# Patient Record
Sex: FEMALE | Race: WHITE | Employment: FULL TIME | URBAN - NONMETROPOLITAN AREA
[De-identification: names, ages, dates, MRNs, and addresses within clinical notes are randomized per-mention and may not be internally consistent; named-entity substitution may affect disease eponyms.]

---

## 2023-08-01 ASSESSMENT — PATIENT HEALTH QUESTIONNAIRE - PHQ9
SUM OF ALL RESPONSES TO PHQ QUESTIONS 1-9: 1
SUM OF ALL RESPONSES TO PHQ9 QUESTIONS 1 & 2: 1
1. LITTLE INTEREST OR PLEASURE IN DOING THINGS: NOT AT ALL
SUM OF ALL RESPONSES TO PHQ QUESTIONS 1-9: 1
2. FEELING DOWN, DEPRESSED OR HOPELESS: SEVERAL DAYS
1. LITTLE INTEREST OR PLEASURE IN DOING THINGS: 0
SUM OF ALL RESPONSES TO PHQ QUESTIONS 1-9: 1
SUM OF ALL RESPONSES TO PHQ9 QUESTIONS 1 & 2: 1
SUM OF ALL RESPONSES TO PHQ QUESTIONS 1-9: 1
2. FEELING DOWN, DEPRESSED OR HOPELESS: 1

## 2023-08-04 ENCOUNTER — OFFICE VISIT (OUTPATIENT)
Dept: OBGYN CLINIC | Age: 23
End: 2023-08-04
Payer: COMMERCIAL

## 2023-08-04 VITALS
DIASTOLIC BLOOD PRESSURE: 62 MMHG | BODY MASS INDEX: 31.55 KG/M2 | HEART RATE: 77 BPM | SYSTOLIC BLOOD PRESSURE: 113 MMHG | WEIGHT: 167 LBS

## 2023-08-04 DIAGNOSIS — Z01.419 WELL WOMAN EXAM WITH ROUTINE GYNECOLOGICAL EXAM: Primary | ICD-10-CM

## 2023-08-04 DIAGNOSIS — N92.6 IRREGULAR MENSES: ICD-10-CM

## 2023-08-04 DIAGNOSIS — Z12.39 ENCOUNTER FOR SCREENING BREAST EXAMINATION: ICD-10-CM

## 2023-08-04 PROCEDURE — 99395 PREV VISIT EST AGE 18-39: CPT | Performed by: NURSE PRACTITIONER

## 2023-08-04 ASSESSMENT — ENCOUNTER SYMPTOMS
RESPIRATORY NEGATIVE: 1
EYES NEGATIVE: 1
GASTROINTESTINAL NEGATIVE: 1
ALLERGIC/IMMUNOLOGIC NEGATIVE: 1

## 2023-11-22 ENCOUNTER — TELEPHONE (OUTPATIENT)
Dept: OBGYN CLINIC | Age: 23
End: 2023-11-22

## 2023-11-22 NOTE — TELEPHONE ENCOUNTER
Patient called in for pregnancy confirmation   positive test 11/22/23 last cycle 10/09/23  Please return call asap  Thank you

## 2023-11-24 SDOH — ECONOMIC STABILITY: TRANSPORTATION INSECURITY
IN THE PAST 12 MONTHS, HAS LACK OF TRANSPORTATION KEPT YOU FROM MEETINGS, WORK, OR FROM GETTING THINGS NEEDED FOR DAILY LIVING?: NO

## 2023-11-24 SDOH — ECONOMIC STABILITY: HOUSING INSECURITY
IN THE LAST 12 MONTHS, WAS THERE A TIME WHEN YOU DID NOT HAVE A STEADY PLACE TO SLEEP OR SLEPT IN A SHELTER (INCLUDING NOW)?: NO

## 2023-11-24 SDOH — ECONOMIC STABILITY: FOOD INSECURITY: WITHIN THE PAST 12 MONTHS, THE FOOD YOU BOUGHT JUST DIDN'T LAST AND YOU DIDN'T HAVE MONEY TO GET MORE.: NEVER TRUE

## 2023-11-24 SDOH — ECONOMIC STABILITY: FOOD INSECURITY: WITHIN THE PAST 12 MONTHS, YOU WORRIED THAT YOUR FOOD WOULD RUN OUT BEFORE YOU GOT MONEY TO BUY MORE.: NEVER TRUE

## 2023-11-24 SDOH — ECONOMIC STABILITY: INCOME INSECURITY: HOW HARD IS IT FOR YOU TO PAY FOR THE VERY BASICS LIKE FOOD, HOUSING, MEDICAL CARE, AND HEATING?: NOT HARD AT ALL

## 2023-11-27 ENCOUNTER — OFFICE VISIT (OUTPATIENT)
Dept: OBGYN CLINIC | Age: 23
End: 2023-11-27
Payer: COMMERCIAL

## 2023-11-27 VITALS
BODY MASS INDEX: 30.78 KG/M2 | SYSTOLIC BLOOD PRESSURE: 102 MMHG | DIASTOLIC BLOOD PRESSURE: 68 MMHG | HEIGHT: 61 IN | WEIGHT: 163 LBS

## 2023-11-27 DIAGNOSIS — N91.2 AMENORRHEA: ICD-10-CM

## 2023-11-27 DIAGNOSIS — N91.2 AMENORRHEA: Primary | ICD-10-CM

## 2023-11-27 LAB
CONTROL: NORMAL
GONADOTROPIN, CHORIONIC (HCG) QUANT: ABNORMAL MIU/ML (ref 0–5.3)
PREGNANCY TEST URINE, POC: POSITIVE

## 2023-11-27 PROCEDURE — 99213 OFFICE O/P EST LOW 20 MIN: CPT | Performed by: NURSE PRACTITIONER

## 2023-11-27 ASSESSMENT — ENCOUNTER SYMPTOMS
NAUSEA: 1
EYES NEGATIVE: 1
RESPIRATORY NEGATIVE: 1
ALLERGIC/IMMUNOLOGIC NEGATIVE: 1
VOMITING: 0

## 2023-11-27 NOTE — PATIENT INSTRUCTIONS
Approved Prenatal Medicines          Listed are medications you can take safely for the minor discomfort of pregnancy. In general, it is best to avoid any medications during the first twelve weeks of pregnancy. If you are taking a prescription, we should be notified as soon as possible. Cough/Colds         *Guaifenesin-Mucinex   *Dextromethorphan   *Robitussin (Cough)   *Robitussin DM   (Non-productive cough)   *Mucinex   *Dayquil   *Nyquil   *Tylenol Cold   *Cough Drops   *Sudafed   *Afrin Nasal [place]   *Vicks Vapor Rub   *Halls Lozenge  Nausea     *Small frequent meals   *Ginger Ale   *Vitamin B6   *Vitamin B12   *Unison   *Sea Bands (Form of acupuncture on wrist)   *Sweet Fruit Syrup from canned fruits (pears/peaches)   *Emetrol  Heartburn, Indigestion, Gas     *Tums/Rolaids   *Pepcid/Prilosec OTC   *Mylicon   *Gas-X   *Milk of Magnesia   *Tagamet/Zantac   *Mylanta (safe to use 1st 12 weeks)   DO NOT USE:   *Pepto Bismol/   *Donnatal  *Risa-Suitland  *Aspirin    Antihistamines     *Zyrtec   *Actifed   *Tussinex   *Claritin   *Nolahist   *Benadryl   *Dimetane   *Dimetapp   *Lodiane   *Chlor-trimeton  Diarrhea     *Imodium   *Kaopectate   *Follow BRAT diet:   (banana, rice, applesauce, toast)    *Drink Gatorade/Pedialyte   Decongestants     *Sudafed (pseudoephedrine)   *Robitussin CF   *Ocean Mist Nasal Spray   *Good Hope Pot   *Flonase    Insomnia     *Benadryl   *Unisom   *Phenergan   *Tylenol PM  Sweeteners     *NutraSweet   *Splenda   *Equal  Toothache     *Orajel   *Cavity Filling w/Novocaine (have dental ray w/lead shield)    Sore Throat     *Cepcaol Lozenges   *Chlorasepic Lozenges   *Sucrets Lozenges  Constipation     *Colace   *Milk of Magnesia   *Fibercon   *Fiberall   *Citrucell   *Metamucil   *Senokot S   *Dulcolax   *Magnesium Citrate   *Glycerin  Hemorrhoids     *Preparation H   *Anusol   *Tucks   *Sitz Bathes      Acetaminophen is safe to take throughout all stages of pregnancy.  It is an effective

## 2023-11-28 DIAGNOSIS — Z36.89 CONFIRM FETAL CARDIAC ACTIVITY USING ULTRASOUND: Primary | ICD-10-CM

## 2023-12-07 ENCOUNTER — PATIENT MESSAGE (OUTPATIENT)
Dept: OBGYN CLINIC | Age: 23
End: 2023-12-07

## 2023-12-08 RX ORDER — ONDANSETRON 4 MG/1
4 TABLET, ORALLY DISINTEGRATING ORAL 3 TIMES DAILY PRN
Qty: 21 TABLET | Refills: 0 | Status: SHIPPED | OUTPATIENT
Start: 2023-12-08

## 2023-12-08 NOTE — TELEPHONE ENCOUNTER
From: Ishan Coleman  To: Chaim Gonzalez APRN - CNM  Sent: 12/7/2023 4:55 PM CST  Subject: Nausea Medication     Good afternoon! I know at my first appointment a couple of weeks ago it was brought up that I could order something for my morning sickness to help calm it down. Would it be possible to get that ordered and sent to my pharmacy on file?     Thank you,  Mimi Ray

## 2023-12-14 ENCOUNTER — INITIAL PRENATAL (OUTPATIENT)
Dept: OBGYN CLINIC | Age: 23
End: 2023-12-14

## 2023-12-14 VITALS
HEART RATE: 92 BPM | DIASTOLIC BLOOD PRESSURE: 72 MMHG | WEIGHT: 163 LBS | SYSTOLIC BLOOD PRESSURE: 113 MMHG | BODY MASS INDEX: 30.8 KG/M2

## 2023-12-14 DIAGNOSIS — Z34.01 ENCOUNTER FOR SUPERVISION OF NORMAL FIRST PREGNANCY IN FIRST TRIMESTER: Primary | ICD-10-CM

## 2023-12-14 DIAGNOSIS — Z36.9 ANTENATAL SCREENING ENCOUNTER: ICD-10-CM

## 2023-12-14 DIAGNOSIS — Z3A.09 9 WEEKS GESTATION OF PREGNANCY: ICD-10-CM

## 2023-12-14 PROCEDURE — 0500F INITIAL PRENATAL CARE VISIT: CPT | Performed by: NURSE PRACTITIONER

## 2023-12-14 NOTE — PROGRESS NOTES
CNM Prenatal Office Note  Subjective:  Merlinda Fails is here for initial obstetrical visit. She has had confirmation of pregnancy. Her history has been reviewed and OB complications identified. Today she is 9w3d weeks EGA. She is doing well and has no complaints. She  does not have n/v/d/c, vaginal bleeding, or other common first trimester complaints. Objective: Mother's Prenatal Vitals  BP: 113/72  Weight - Scale: 73.9 kg (163 lb)  Pulse: 92  Patient Position: Sitting  Prenatal Fetal Information  Fetal HR: 164  Movement: Absent  Pt is A&Ox3, in no acute distress. Normocephalic, atraumatic. PERRL. Resp even and non-labored. Skin pink, warm & dry. Gravid abdomen. WONG's well. Gait steady. Assessment:    IUP at 9w3d wks      Diagnosis Orders   1. Encounter for supervision of normal first pregnancy in first trimester        2.  screening encounter  HIV Obstetric Panel    Herpes simplex virus (HSV) I/II antibodies IgG & IgM w/ reflex    Varicella Zoster Antibody, IgG    Chlamydia/N. Gonorrhoeae/T. Vaginalis, Urine    Culture, Urine    Hepatitis C Antibody      3. 9 weeks gestation of pregnancy          Plan:  Pt counseled on balanced nutrition, adequate fluid intake, taking PNV daily, and exercise. Continue with routine prenatal care. RTC in 4 wks for prenatal visit, u/s, physical exam, and results  MEDICATIONS:  No orders of the defined types were placed in this encounter. ORDERS:  Orders Placed This Encounter   Procedures    Chlamydia/N. Gonorrhoeae/T. Vaginalis, Urine    Culture, Urine    HIV Obstetric Panel    Herpes simplex virus (HSV) I/II antibodies IgG & IgM w/ reflex    Varicella Zoster Antibody, IgG    Hepatitis C Antibody

## 2023-12-18 DIAGNOSIS — Z36.9 ANTENATAL SCREENING ENCOUNTER: ICD-10-CM

## 2023-12-18 LAB
ABO/RH: NORMAL
ANTIBODY SCREEN: NORMAL
C TRACH DNA UR QL NAA+PROBE: NOT DETECTED
HCV AB SERPL QL IA: NORMAL
N GONORRHOEA DNA UR QL NAA+PROBE: NOT DETECTED
T VAGINALIS DNA UR QL NAA+PROBE: NOT DETECTED

## 2023-12-20 LAB
BACTERIA UR CULT: ABNORMAL
BACTERIA UR CULT: ABNORMAL
ORGANISM: ABNORMAL
VZV IGG SER QL IA: 0.16

## 2023-12-21 LAB
BASOPHILS # BLD: 0 K/UL (ref 0–0.2)
BASOPHILS NFR BLD: 0.3 % (ref 0–1)
EOSINOPHIL # BLD: 0.1 K/UL (ref 0–0.6)
EOSINOPHIL NFR BLD: 0.6 % (ref 0–5)
ERYTHROCYTE [DISTWIDTH] IN BLOOD BY AUTOMATED COUNT: 12.9 % (ref 11.5–14.5)
HBV SURFACE AG SERPL QL IA: ABNORMAL
HCT VFR BLD AUTO: 38.4 % (ref 37–47)
HGB BLD-MCNC: 13.3 G/DL (ref 12–16)
HIV-1 P24 AG: ABNORMAL
HIV1+2 AB SERPLBLD QL IA.RAPID: ABNORMAL
IMM GRANULOCYTES # BLD: 0 K/UL
LYMPHOCYTES # BLD: 2 K/UL (ref 1.1–4.5)
LYMPHOCYTES NFR BLD: 21.6 % (ref 20–40)
MCH RBC QN AUTO: 29.4 PG (ref 27–31)
MCHC RBC AUTO-ENTMCNC: 34.6 G/DL (ref 33–37)
MCV RBC AUTO: 85 FL (ref 81–99)
MONOCYTES # BLD: 0.5 K/UL (ref 0–0.9)
MONOCYTES NFR BLD: 5.2 % (ref 0–10)
NEUTROPHILS # BLD: 6.8 K/UL (ref 1.5–7.5)
NEUTS SEG NFR BLD: 71.9 % (ref 50–65)
PLATELET # BLD AUTO: 262 K/UL (ref 130–400)
PMV BLD AUTO: 10.3 FL (ref 9.4–12.3)
RBC # BLD AUTO: 4.52 M/UL (ref 4.2–5.4)
RPR SER QL: ABNORMAL
RUBV IGG SER-ACNC: REACTIVE [IU]/ML
WBC # BLD AUTO: 9.4 K/UL (ref 4.8–10.8)

## 2024-01-11 ENCOUNTER — ROUTINE PRENATAL (OUTPATIENT)
Dept: OBGYN CLINIC | Age: 24
End: 2024-01-11

## 2024-01-11 VITALS
BODY MASS INDEX: 29.66 KG/M2 | WEIGHT: 157 LBS | DIASTOLIC BLOOD PRESSURE: 80 MMHG | SYSTOLIC BLOOD PRESSURE: 119 MMHG | HEART RATE: 111 BPM

## 2024-01-11 DIAGNOSIS — Z3A.13 13 WEEKS GESTATION OF PREGNANCY: ICD-10-CM

## 2024-01-11 DIAGNOSIS — O23.42 URINARY TRACT INFECTION IN MOTHER DURING SECOND TRIMESTER OF PREGNANCY: ICD-10-CM

## 2024-01-11 DIAGNOSIS — Z34.02 ENCOUNTER FOR SUPERVISION OF NORMAL FIRST PREGNANCY IN SECOND TRIMESTER: Primary | ICD-10-CM

## 2024-01-11 PROCEDURE — 0502F SUBSEQUENT PRENATAL CARE: CPT | Performed by: NURSE PRACTITIONER

## 2024-01-11 NOTE — PROGRESS NOTES
Pt is here for routine parental care. Pt denies vaginal bleeding, cramping or leaking of fluid.

## 2024-01-11 NOTE — PROGRESS NOTES
[facility] Prenatal Office Note  Subjective:  Matthew Rivas is here for a return obstetrical visit. Today she is 13w3d weeks EGA. She is doing well, taking her PNV as directed, and has no complaints.  She  does not have vaginal bleeding, n/v, syncope, or headaches.  Pt does not feel fetal movement regularly.    Objective:  Mother's Prenatal Vitals  BP: 119/80  Weight - Scale: 71.2 kg (157 lb)  Pulse: (!) 111  Patient Position: Sitting  Prenatal Fetal Information  Movement: Absent  Pt is A&Ox3, in no acute distress. Normocephalic, atraumatic. PERRL. Resp even and non-labored. Skin pink, warm & dry. Gravid abdomen. WONG's well. Gait steady.   Assessment:    IUP at 13w3d wks      Diagnosis Orders   1. 13 weeks gestation of pregnancy  Culture, Urine      2. Encounter for supervision of normal first pregnancy in second trimester        3. Urinary tract infection in mother during second trimester of pregnancy          Plan:  Pt counseled on balanced nutrition, adequate fluid intake, taking PNV daily, and exercise along with  review of prenatal and genetic labs.  Continue with routine prenatal care.  Return in about 4 weeks (around 2/8/2024) for prenatal.   Pt has anatomy scan 03/05/24      MEDICATIONS:  No orders of the defined types were placed in this encounter.    ORDERS:  Orders Placed This Encounter   Procedures    Culture, Urine     I reviewed labs and genetic testing with the pt. I will order another urine culture. Pt did not take antibiotics, she did drink cranberry juice.       More than 50% of this 20 min visit was education and counseling.    I JESSICA Flores, am scribing for and in the presence of Niya Starkey CNM.  1/11/2024 10:08 AM     I have seen and examined the patient independently.  I reviewed all laboratory and imaging studies that are relevant.  I have reviewed and made any appropriate changes to the HPI.    Electronically signed by SHANTI Albarado CNM on 1/11/2024 at 10:08 AM

## 2024-01-12 RX ORDER — NITROFURANTOIN 25; 75 MG/1; MG/1
100 CAPSULE ORAL 2 TIMES DAILY
Qty: 14 CAPSULE | Refills: 0 | Status: SHIPPED | OUTPATIENT
Start: 2024-01-12 | End: 2024-01-19

## 2024-01-13 LAB
BACTERIA UR CULT: ABNORMAL
BACTERIA UR CULT: ABNORMAL
ORGANISM: ABNORMAL

## 2024-02-08 ENCOUNTER — ROUTINE PRENATAL (OUTPATIENT)
Dept: OBGYN CLINIC | Age: 24
End: 2024-02-08

## 2024-02-08 VITALS
DIASTOLIC BLOOD PRESSURE: 74 MMHG | WEIGHT: 156 LBS | SYSTOLIC BLOOD PRESSURE: 110 MMHG | HEART RATE: 101 BPM | BODY MASS INDEX: 29.48 KG/M2

## 2024-02-08 DIAGNOSIS — Z34.02 ENCOUNTER FOR SUPERVISION OF NORMAL FIRST PREGNANCY IN SECOND TRIMESTER: Primary | ICD-10-CM

## 2024-02-08 DIAGNOSIS — Z3A.17 17 WEEKS GESTATION OF PREGNANCY: ICD-10-CM

## 2024-02-08 DIAGNOSIS — O23.42 URINARY TRACT INFECTION IN MOTHER DURING SECOND TRIMESTER OF PREGNANCY: ICD-10-CM

## 2024-02-08 PROCEDURE — 0502F SUBSEQUENT PRENATAL CARE: CPT | Performed by: NURSE PRACTITIONER

## 2024-02-08 NOTE — PROGRESS NOTES
CNM Prenatal Office Note  Subjective:  Matthew Rivas is here for a return obstetrical visit. Today she is 17w3d weeks EGA. She is doing well, taking her PNV as directed, and has no complaints.  She  does not have vaginal bleeding, n/v, syncope, or headaches.  Pt does feel fetal movement regularly.    Feeling better, less nauseated and more energy.   Objective:  Mother's Prenatal Vitals  BP: 110/74  Weight - Scale: 70.8 kg (156 lb)  Pulse: (!) 101  Patient Position: Sitting  Prenatal Fetal Information  Fetal HR: 132- u/s  Movement: Absent  Pt is A&Ox3, in no acute distress. Normocephalic, atraumatic. PERRL. Resp even and non-labored. Skin pink, warm & dry. Gravid abdomen. WONG's well. Gait steady.   Assessment:    IUP at 17w3d wks      Diagnosis Orders   1. Encounter for supervision of normal first pregnancy in second trimester        2. 17 weeks gestation of pregnancy        3. Urinary tract infection in mother during second trimester of pregnancy  Culture, Urine        Plan:  Pt counseled on balanced nutrition, adequate fluid intake, taking PNV daily, and exercise along with  upcoming anatomy scan  Continue with routine prenatal care.  Return in about 4 weeks (around 3/7/2024) for prenatal.       MEDICATIONS:  No orders of the defined types were placed in this encounter.    ORDERS:  Orders Placed This Encounter   Procedures    Culture, Urine       More than 50% of this 20 min visit was education and counseling.

## 2024-02-08 NOTE — PROGRESS NOTES
Pt is here for routine parental care. Pt denies vaginal bleeding, cramping or leaking of fluid. She is scheduled with PG for anatomy.

## 2024-02-08 NOTE — PATIENT INSTRUCTIONS
Letter produced and e-mailed. SEBASTIAN Mcelroy   questions about a medical condition or this instruction, always ask your healthcare professional. [employer] disclaims any warranty or liability for your use of this information.

## 2024-02-10 LAB — BACTERIA UR CULT: NORMAL

## 2024-02-23 ENCOUNTER — HOSPITAL ENCOUNTER (EMERGENCY)
Age: 24
Discharge: HOME | End: 2024-02-23
Attending: STUDENT IN AN ORGANIZED HEALTH CARE EDUCATION/TRAINING PROGRAM
Payer: COMMERCIAL

## 2024-02-23 ENCOUNTER — APPOINTMENT (OUTPATIENT)
Dept: GENERAL RADIOLOGY | Age: 24
End: 2024-02-23
Payer: COMMERCIAL

## 2024-02-23 ENCOUNTER — APPOINTMENT (OUTPATIENT)
Dept: CT IMAGING | Age: 24
End: 2024-02-23
Payer: COMMERCIAL

## 2024-02-23 VITALS
SYSTOLIC BLOOD PRESSURE: 101 MMHG | DIASTOLIC BLOOD PRESSURE: 66 MMHG | WEIGHT: 157 LBS | BODY MASS INDEX: 29.66 KG/M2 | OXYGEN SATURATION: 98 % | RESPIRATION RATE: 16 BRPM | TEMPERATURE: 99.1 F | HEART RATE: 114 BPM

## 2024-02-23 DIAGNOSIS — J18.9 PNEUMONIA DUE TO INFECTIOUS ORGANISM, UNSPECIFIED LATERALITY, UNSPECIFIED PART OF LUNG: Primary | ICD-10-CM

## 2024-02-23 DIAGNOSIS — R00.0 TACHYCARDIA: ICD-10-CM

## 2024-02-23 LAB
ALBUMIN SERPL-MCNC: 3.5 G/DL (ref 3.5–5.2)
ALP SERPL-CCNC: 106 U/L (ref 35–104)
ALT SERPL-CCNC: 72 U/L (ref 5–33)
ANION GAP SERPL CALCULATED.3IONS-SCNC: 9 MMOL/L (ref 7–19)
AST SERPL-CCNC: 38 U/L (ref 5–32)
BASOPHILS # BLD: 0 K/UL (ref 0–0.2)
BASOPHILS NFR BLD: 0.2 % (ref 0–1)
BILIRUB SERPL-MCNC: 0.4 MG/DL (ref 0.2–1.2)
BUN SERPL-MCNC: 5 MG/DL (ref 6–20)
CALCIUM SERPL-MCNC: 9.2 MG/DL (ref 8.6–10)
CHLORIDE SERPL-SCNC: 105 MMOL/L (ref 98–111)
CO2 SERPL-SCNC: 24 MMOL/L (ref 22–29)
CREAT SERPL-MCNC: 0.4 MG/DL (ref 0.5–0.9)
D DIMER PPP FEU-MCNC: 1.08 UG/ML FEU (ref 0–0.48)
EOSINOPHIL # BLD: 0 K/UL (ref 0–0.6)
EOSINOPHIL NFR BLD: 0.1 % (ref 0–5)
ERYTHROCYTE [DISTWIDTH] IN BLOOD BY AUTOMATED COUNT: 15.3 % (ref 11.5–14.5)
FLUAV AG NPH QL: NEGATIVE
FLUBV AG NPH QL: NEGATIVE
GLUCOSE SERPL-MCNC: 89 MG/DL (ref 74–109)
HCT VFR BLD AUTO: 30.5 % (ref 37–47)
HGB BLD-MCNC: 10.5 G/DL (ref 12–16)
IMM GRANULOCYTES # BLD: 0.1 K/UL
LIPASE SERPL-CCNC: 21 U/L (ref 13–60)
LYMPHOCYTES # BLD: 0.7 K/UL (ref 1.1–4.5)
LYMPHOCYTES NFR BLD: 7.5 % (ref 20–40)
MCH RBC QN AUTO: 30.3 PG (ref 27–31)
MCHC RBC AUTO-ENTMCNC: 34.4 G/DL (ref 33–37)
MCV RBC AUTO: 87.9 FL (ref 81–99)
MONOCYTES # BLD: 0.5 K/UL (ref 0–0.9)
MONOCYTES NFR BLD: 6 % (ref 0–10)
NEUTROPHILS # BLD: 7.5 K/UL (ref 1.5–7.5)
NEUTS SEG NFR BLD: 84.8 % (ref 50–65)
PLATELET # BLD AUTO: 193 K/UL (ref 130–400)
PMV BLD AUTO: 9.9 FL (ref 9.4–12.3)
POTASSIUM SERPL-SCNC: 4.1 MMOL/L (ref 3.5–5)
PROT SERPL-MCNC: 6.2 G/DL (ref 6.6–8.7)
RBC # BLD AUTO: 3.47 M/UL (ref 4.2–5.4)
SARS-COV-2 RDRP RESP QL NAA+PROBE: NOT DETECTED
SODIUM SERPL-SCNC: 138 MMOL/L (ref 136–145)
TROPONIN, HIGH SENSITIVITY: <6 NG/L (ref 0–14)
TSH SERPL DL<=0.005 MIU/L-ACNC: 0.57 UIU/ML (ref 0.35–5.5)
WBC # BLD AUTO: 8.8 K/UL (ref 4.8–10.8)

## 2024-02-23 PROCEDURE — 96361 HYDRATE IV INFUSION ADD-ON: CPT

## 2024-02-23 PROCEDURE — 83690 ASSAY OF LIPASE: CPT

## 2024-02-23 PROCEDURE — 87635 SARS-COV-2 COVID-19 AMP PRB: CPT

## 2024-02-23 PROCEDURE — 85379 FIBRIN DEGRADATION QUANT: CPT

## 2024-02-23 PROCEDURE — 84484 ASSAY OF TROPONIN QUANT: CPT

## 2024-02-23 PROCEDURE — 71275 CT ANGIOGRAPHY CHEST: CPT

## 2024-02-23 PROCEDURE — 71045 X-RAY EXAM CHEST 1 VIEW: CPT

## 2024-02-23 PROCEDURE — 2580000003 HC RX 258: Performed by: EMERGENCY MEDICINE

## 2024-02-23 PROCEDURE — 85025 COMPLETE CBC W/AUTO DIFF WBC: CPT

## 2024-02-23 PROCEDURE — 36415 COLL VENOUS BLD VENIPUNCTURE: CPT

## 2024-02-23 PROCEDURE — 6360000004 HC RX CONTRAST MEDICATION: Performed by: STUDENT IN AN ORGANIZED HEALTH CARE EDUCATION/TRAINING PROGRAM

## 2024-02-23 PROCEDURE — 2580000003 HC RX 258: Performed by: STUDENT IN AN ORGANIZED HEALTH CARE EDUCATION/TRAINING PROGRAM

## 2024-02-23 PROCEDURE — 87804 INFLUENZA ASSAY W/OPTIC: CPT

## 2024-02-23 PROCEDURE — 96360 HYDRATION IV INFUSION INIT: CPT

## 2024-02-23 PROCEDURE — 99285 EMERGENCY DEPT VISIT HI MDM: CPT

## 2024-02-23 PROCEDURE — 84443 ASSAY THYROID STIM HORMONE: CPT

## 2024-02-23 PROCEDURE — 80053 COMPREHEN METABOLIC PANEL: CPT

## 2024-02-23 RX ORDER — CEFDINIR 300 MG/1
300 CAPSULE ORAL 2 TIMES DAILY
Qty: 14 CAPSULE | Refills: 0 | Status: SHIPPED | OUTPATIENT
Start: 2024-02-23 | End: 2024-03-01

## 2024-02-23 RX ORDER — 0.9 % SODIUM CHLORIDE 0.9 %
1000 INTRAVENOUS SOLUTION INTRAVENOUS ONCE
Status: COMPLETED | OUTPATIENT
Start: 2024-02-23 | End: 2024-02-23

## 2024-02-23 RX ORDER — AZITHROMYCIN 250 MG/1
TABLET, FILM COATED ORAL
Qty: 1 PACKET | Refills: 0 | Status: SHIPPED | OUTPATIENT
Start: 2024-02-23 | End: 2024-02-27

## 2024-02-23 RX ADMIN — SODIUM CHLORIDE 1000 ML: 9 INJECTION, SOLUTION INTRAVENOUS at 19:44

## 2024-02-23 RX ADMIN — IOPAMIDOL 50 ML: 755 INJECTION, SOLUTION INTRAVENOUS at 18:27

## 2024-02-23 RX ADMIN — SODIUM CHLORIDE 1000 ML: 9 INJECTION, SOLUTION INTRAVENOUS at 17:39

## 2024-02-23 ASSESSMENT — ENCOUNTER SYMPTOMS
NAUSEA: 0
EYE REDNESS: 0
COUGH: 1
SORE THROAT: 0
ABDOMINAL PAIN: 0
VOMITING: 0
DIARRHEA: 0
CHEST TIGHTNESS: 1
EYE PAIN: 0
SHORTNESS OF BREATH: 1

## 2024-02-24 NOTE — ED PROVIDER NOTES
Health system EMERGENCY DEPT  eMERGENCY dEPARTMENT eNCOUnter      Pt Name: Matthew Rivas  MRN: [id number]  Birthdate 2000  Date of evaluation: 2024  Provider: Tawnya Glass MD    CHIEF COMPLAINT       Chief Complaint   Patient presents with    Tachycardia     19 weeks pregnant, pt reports onset of fast heart rate at 1300 today    Shortness of Breath     Onset last night         HISTORY OF PRESENT ILLNESS   (Location/Symptom, Timing/Onset,Context/Setting, Quality, Duration, Modifying Factors, Severity)  Note limiting factors.     HPI    Matthew Rivas is a pleasant 24 y.o. female  at 19w4d gestation, following with SHANTI High, who presents with tachycardia/palpitations, chest pressure, and dyspnea which started today.  Associated with some headache and cough.  She denies fevers, chills, congestion, rhinorrhea, chest pain (described as pressure), abdominal pain, nausea, vomiting, diarrhea, or vaginal symptoms.  She denies any abdominal cramping, loss of fluid, vaginal bleeding.  She denies any problems with her pregnancy leading up until now.  She reports that at baseline, prior to pregnancy, she has been known to have an elevated resting heart rate in the 90s to low 100s and has been evaluated for this before with heart monitor but does not carry any specific cardiac diagnoses and is not on any medication for this.  She has no history of DVT or PE, has not noted any pedal edema, denies hemoptysis, and does not have any recent surgeries, hospitalizations, or immobilizations.  She denies sick contacts.      NursingNotes were reviewed.    REVIEW OF SYSTEMS    (2-9 systems for level 4, 10 or more for level 5)     Review of Systems   Constitutional:  Negative for chills, fatigue and fever.   HENT:  Negative for congestion and sore throat.    Eyes:  Negative for pain and redness.   Respiratory:  Positive for cough, chest tightness and shortness of breath.    Cardiovascular:  Positive for palpitations.  DIFFERENTIAL - Abnormal; Notable for the following components:       Result Value    RBC 3.47 (*)     Hemoglobin 10.5 (*)     Hematocrit 30.5 (*)     RDW 15.3 (*)     Neutrophils % 84.8 (*)     Lymphocytes % 7.5 (*)     Lymphocytes Absolute 0.7 (*)     All other components within normal limits   COMPREHENSIVE METABOLIC PANEL W/ REFLEX TO MG FOR LOW K - Abnormal; Notable for the following components:    BUN 5 (*)     Creatinine 0.4 (*)     Total Protein 6.2 (*)     Alkaline Phosphatase 106 (*)     ALT 72 (*)     AST 38 (*)     All other components within normal limits   D-DIMER, QUANTITATIVE - Abnormal; Notable for the following components:    D-Dimer, Quant 1.08 (*)     All other components within normal limits   COVID-19, RAPID   RAPID INFLUENZA A/B ANTIGENS   TSH WITH REFLEX TO FT4   TROPONIN   LIPASE       All other labs were within normal range or not returned as of this dictation.    EMERGENCY DEPARTMENT COURSE and DIFFERENTIAL DIAGNOSIS/MDM:   Vitals:    Vitals:    02/23/24 1659   BP: 110/71   Pulse: (!) 120   Resp: 18   Temp: 99.1 °F (37.3 °C)   SpO2: 98%   Weight: 71.2 kg (157 lb)       MDM      Reassessment    Patient is a 24 y.o. female presenting with chest pressure, shortness of breath, palpitations.  Currently 19 weeks pregnant.    CBC, CMP obtained.  Notable for mild chronic anemia with hemoglobin 10.5.  CMP is notable for slightly elevated transaminases with alk phos 106, ALT 72, AST 38.  She is not having any abdominal pain, fevers, nausea, vomiting.  EKG sinus tachycardia without ischemic changes.  TSH within normal limits.  Troponin pending.  COVID and flu testing was negative.  Chest x-ray unremarkable on my read.  Obtain D-dimer to rule out PE but this was slightly elevated at 1.08, so following up with CTA chest to rule out PE.    Bedside ultrasound was done which showed single live IUP with good fetal movement, fetal heart rate of 150 bpm.    Currently pending:  - troponin  - lipase  - CTA

## 2024-02-24 NOTE — ED PROVIDER NOTES
[facility] EMERGENCY DEPT  eMERGENCYdEPARTMENT eNCOUnter      Pt Name: Matthew Rivas  MRN: [id number]  Birthdate 2000  Date of evaluation: 2/23/2024  Provider:FATOU MONACO MD    Emergency Department care of this patient was assumed at 1830 from Dr. Glass.  We have discussed the case and the plan of care.  I have seen and evaluated patient and reviewed ED course.      CHIEF COMPLAINT       Chief Complaint   Patient presents with    Tachycardia     19 weeks pregnant, pt reports onset of fast heart rate at 1300 today    Shortness of Breath     Onset last night         PHYSICAL EXAM    (up to 7 for level 4, 8 or more for level 5)     ED Triage Vitals [02/23/24 1659]   BP Temp Temp src Pulse Respirations SpO2 Height Weight - Scale   110/71 99.1 °F (37.3 °C) -- (!) 120 18 98 % -- 71.2 kg (157 lb)       Physical Exam  Vitals and nursing note reviewed.   Constitutional:       Appearance: She is well-developed. She is not ill-appearing or diaphoretic.      Comments: Conversive in no distress   Neurological:      Mental Status: She is alert.         DIAGNOSTIC RESULTS         RADIOLOGY:   Non-plain film imagessuch as CT, Ultrasound and MRI are read by the radiologist. Plain radiographic images are visualized and preliminarily interpreted by the emergency physician with the below findings:      CTA PULMONARY W CONTRAST   Final Result   1.  Normal CT pulmonary angiogram with no evidence of pulmonary artery embolism.   2.  Multifocal pulmonary infiltrates, worse in the right upper lobe consistent with an infectious/inflammatory process.        All CT scans are performed using dose optimization techniques as appropriate to the performed exam and include    at least one of the following: Automated exposure control, adjustment of the mA and/or kV according to size, and the use of iterative reconstruction technique.        ______________________________________    Electronically signed by: FRITZ FISHMAN M.D.   Date:     02/23/2024    Time:    19:15       XR CHEST PORTABLE   Final Result   Impression:  Patchy right lung infiltrates suspicious for pneumonia           ______________________________________    Electronically signed by: FAROOQ DIAL M.D.   Date:     02/23/2024   Time:    18:44               LABS:  Labs Reviewed   CBC WITH AUTO DIFFERENTIAL - Abnormal; Notable for the following components:       Result Value    RBC 3.47 (*)     Hemoglobin 10.5 (*)     Hematocrit 30.5 (*)     RDW 15.3 (*)     Neutrophils % 84.8 (*)     Lymphocytes % 7.5 (*)     Lymphocytes Absolute 0.7 (*)     All other components within normal limits   COMPREHENSIVE METABOLIC PANEL W/ REFLEX TO MG FOR LOW K - Abnormal; Notable for the following components:    BUN 5 (*)     Creatinine 0.4 (*)     Total Protein 6.2 (*)     Alkaline Phosphatase 106 (*)     ALT 72 (*)     AST 38 (*)     All other components within normal limits   D-DIMER, QUANTITATIVE - Abnormal; Notable for the following components:    D-Dimer, Quant 1.08 (*)     All other components within normal limits   COVID-19, RAPID   RAPID INFLUENZA A/B ANTIGENS   TSH WITH REFLEX TO FT4   TROPONIN   LIPASE       All other labs were within normal range or not returned as of this dictation.    EMERGENCY DEPARTMENT COURSE and DIFFERENTIAL DIAGNOSIS/MDM:   Vitals:    Vitals:    02/23/24 1659 02/23/24 1815 02/23/24 1858 02/23/24 2000   BP: 110/71 (!) 90/55 (!) 86/58 101/66   Pulse: (!) 120 (!) 117 (!) 110 (!) 114   Resp: 18 17 18 16   Temp: 99.1 °F (37.3 °C)      SpO2: 98% 100% 99% 98%   Weight: 71.2 kg (157 lb)          MDM    Patient 19 weeks pregnant here with acute onset infectious type symptoms as well as chest pain and shortness of breath.  Negative COVID and flu and labs are reassuring but did have an elevated D-dimer and has CTA pending.    CTA negative for PE but does have a multi focal pneumonia.  Suspicious still that this could be viral etiology however given she is pregnant and going to cover her

## 2024-03-06 ENCOUNTER — ROUTINE PRENATAL (OUTPATIENT)
Dept: OBGYN CLINIC | Age: 24
End: 2024-03-06

## 2024-03-06 VITALS
WEIGHT: 156 LBS | SYSTOLIC BLOOD PRESSURE: 114 MMHG | DIASTOLIC BLOOD PRESSURE: 77 MMHG | HEART RATE: 101 BPM | BODY MASS INDEX: 29.48 KG/M2

## 2024-03-06 DIAGNOSIS — Z3A.21 21 WEEKS GESTATION OF PREGNANCY: Primary | ICD-10-CM

## 2024-03-06 DIAGNOSIS — O99.012 ANEMIA DURING PREGNANCY IN SECOND TRIMESTER: ICD-10-CM

## 2024-03-06 DIAGNOSIS — Z34.02 ENCOUNTER FOR SUPERVISION OF NORMAL FIRST PREGNANCY IN SECOND TRIMESTER: ICD-10-CM

## 2024-03-06 PROCEDURE — 0502F SUBSEQUENT PRENATAL CARE: CPT | Performed by: NURSE PRACTITIONER

## 2024-03-06 RX ORDER — FERROUS SULFATE 137(45) MG
142 TABLET, EXTENDED RELEASE ORAL 2 TIMES DAILY
Qty: 60 TABLET | Refills: 3 | Status: SHIPPED | OUTPATIENT
Start: 2024-03-06

## 2024-03-06 NOTE — PROGRESS NOTES
CNM Prenatal Office Note  Subjective:  Matthew Rivas is here for a return obstetrical visit. Today she is 21w2d weeks EGA. She is doing well, taking her PNV as directed, and has no complaints.  She  does not have vaginal bleeding, n/v, syncope, or headaches.  Pt does feel fetal movement regularly.    Objective:  Mother's Prenatal Vitals  BP: 114/77  Weight - Scale: 70.8 kg (156 lb)  Pulse: (!) 101  Patient Position: Sitting  Prenatal Fetal Information  Fetal HR:   Movement: Present  Pt is A&Ox3, in no acute distress. Normocephalic, atraumatic. PERRL. Resp even and non-labored. Skin pink, warm & dry. Gravid abdomen. WONG's well. Gait steady.   Assessment:    IUP at 21w2d wks      Diagnosis Orders   1. 21 weeks gestation of pregnancy        2. Encounter for supervision of normal first pregnancy in second trimester        3. Anemia during pregnancy in second trimester          Plan:  Pt counseled on balanced nutrition, adequate fluid intake, taking PNV daily, and exercise along with  review of anatomy scan with need for repeat for sub op views   Continue with routine prenatal care.  Return in about 22 days (around 3/28/2024) for prenatal.       MEDICATIONS:  Orders Placed This Encounter   Medications    ferrous sulfate (SLOW FE) 142 (45 Fe) MG extended release tablet     Sig: Take 142 mg by mouth 2 times daily     Dispense:  60 tablet     Refill:  3     ORDERS:  No orders of the defined types were placed in this encounter.      More than 50% of this 20 min visit was education and counseling.

## 2024-03-06 NOTE — PATIENT INSTRUCTIONS
Patient Education        Weeks 18 to 22 of Your Pregnancy: Care Instructions  At this stage you may find that your nausea and fatigue are gone. You may feel better overall and have more energy. But you might now also have some new discomforts, like sleep problems or leg cramps.    You may start to feel your baby move. These movements can feel like butterflies or bubbles.   Babies at this stage can now suck their thumbs.     Get some exercise every day.  And avoid caffeine late in the day.     Take a warm shower or bath before bed.  Try relaxation exercises to calm your mind and body.     Use extra pillows.  They can help you get comfortable.     Don't use sleeping pills or alcohol.  They could harm your baby.     For leg cramps, stretch and apply heat.  A warm bath, leg warmers, a heating pad, or a hot water bottle can help with muscle aches.   Stretches for leg cramps    Straighten your leg and bend your foot (flex your ankle) slowly upward, toward your knee. Bend your toes up and down.   Stand on a flat surface. Stretch your toes upward. For balance, hold on to the wall or something stable. If it feels okay, take small steps walking on your heels.   Follow-up care is a key part of your treatment and safety. Be sure to make and go to all appointments, and call your doctor if you are having problems. It's also a good idea to know your test results and keep a list of the medicines you take.  Where can you learn more?  Go to https://www.[employer].net/patientEd and enter W603 to learn more about \"Weeks 18 to 22 of Your Pregnancy: Care Instructions.\"  Current as of: July 11, 2023               Content Version: 13.9  © 7505-9322 [employer].   Care instructions adapted under license by [employer]. If you have questions about a medical condition or this instruction, always ask your healthcare professional. [employer] disclaims any warranty or liability for your use of this information.

## 2024-03-28 ENCOUNTER — ROUTINE PRENATAL (OUTPATIENT)
Dept: OBGYN CLINIC | Age: 24
End: 2024-03-28

## 2024-03-28 VITALS — SYSTOLIC BLOOD PRESSURE: 102 MMHG | WEIGHT: 158 LBS | DIASTOLIC BLOOD PRESSURE: 64 MMHG | BODY MASS INDEX: 29.85 KG/M2

## 2024-03-28 DIAGNOSIS — Z34.02 ENCOUNTER FOR SUPERVISION OF NORMAL FIRST PREGNANCY IN SECOND TRIMESTER: Primary | ICD-10-CM

## 2024-03-28 DIAGNOSIS — Z3A.24 24 WEEKS GESTATION OF PREGNANCY: ICD-10-CM

## 2024-03-28 PROCEDURE — 0502F SUBSEQUENT PRENATAL CARE: CPT | Performed by: NURSE PRACTITIONER

## 2024-03-28 NOTE — PROGRESS NOTES
Patient presents today for routine prenatal care. Pt denies any vaginal leaking bleeding or contractions. + Fetal movement.   Abdomen achy

## 2024-03-28 NOTE — PROGRESS NOTES
[facility] Prenatal Office Note  Subjective:  Matthew Rivas is here for a return obstetrical visit. Today she is 24w3d weeks EGA. She is doing well, taking her PNV as directed, and has no complaints.  She  does not have vaginal bleeding, n/v, syncope, or headaches.  Pt does feel fetal movement regularly.  Pt does complain of some pelvic cramping.   Objective:  Mother's Prenatal Vitals  BP: 102/64  Weight - Scale: 71.7 kg (158 lb)  Prenatal Fetal Information  Fetal HR: 138pg  Movement: Present  Pt is A&Ox3, in no acute distress. Normocephalic, atraumatic. PERRL. Resp even and non-labored. Skin pink, warm & dry. Gravid abdomen. WONG's well. Gait steady.   Assessment:    IUP at 24w3d wks      Diagnosis Orders   1. Encounter for supervision of normal first pregnancy in second trimester  Glucose 1 Hour Postprandial    CBC with Auto Differential      2. 24 weeks gestation of pregnancy          Plan:  Pt counseled on balanced nutrition, adequate fluid intake, taking PNV daily, and exercise along with GHTN precautions, Kick count, and  labor  Continue with routine prenatal care.  Return in about 4 weeks (around 2024).   Pt will do her cbc and glucose at her next visit.       MEDICATIONS:  No orders of the defined types were placed in this encounter.    ORDERS:  Orders Placed This Encounter   Procedures    Glucose 1 Hour Postprandial    CBC with Auto Differential       More than 50% of this 20 min visit was education and counseling.    JESSICA Steinberg, am scribing for and in the presence of SINGH High.  3/28/24  9:22 AM CDT     I have seen and examined the patient independently.  I reviewed all laboratory and imaging studies that are relevant.  I have reviewed and made any appropriate changes to the HPI.    Electronically signed by SHANTI Albarado CNM on 24  at 8:54 AM CDT

## 2024-04-25 ENCOUNTER — ROUTINE PRENATAL (OUTPATIENT)
Dept: OBGYN CLINIC | Age: 24
End: 2024-04-25

## 2024-04-25 VITALS
DIASTOLIC BLOOD PRESSURE: 65 MMHG | HEART RATE: 88 BPM | BODY MASS INDEX: 30.61 KG/M2 | SYSTOLIC BLOOD PRESSURE: 95 MMHG | WEIGHT: 162 LBS

## 2024-04-25 DIAGNOSIS — Z3A.28 28 WEEKS GESTATION OF PREGNANCY: Primary | ICD-10-CM

## 2024-04-25 DIAGNOSIS — Z3A.28 28 WEEKS GESTATION OF PREGNANCY: ICD-10-CM

## 2024-04-25 DIAGNOSIS — Z34.03 ENCOUNTER FOR SUPERVISION OF NORMAL FIRST PREGNANCY IN THIRD TRIMESTER: ICD-10-CM

## 2024-04-25 DIAGNOSIS — Z13.32 ENCOUNTER FOR SCREENING FOR MATERNAL DEPRESSION: ICD-10-CM

## 2024-04-25 DIAGNOSIS — Z71.89 ENCOUNTER FOR ANTEPARTUM CONSULTATION REGARDING LACTATION: ICD-10-CM

## 2024-04-25 DIAGNOSIS — Z34.02 ENCOUNTER FOR SUPERVISION OF NORMAL FIRST PREGNANCY IN SECOND TRIMESTER: ICD-10-CM

## 2024-04-25 LAB
BASOPHILS # BLD: 0.1 K/UL (ref 0–0.2)
BASOPHILS NFR BLD: 0.4 % (ref 0–1)
EOSINOPHIL # BLD: 0.1 K/UL (ref 0–0.6)
EOSINOPHIL NFR BLD: 0.6 % (ref 0–5)
ERYTHROCYTE [DISTWIDTH] IN BLOOD BY AUTOMATED COUNT: 14.4 % (ref 11.5–14.5)
GLUCOSE 1H P MEAL SERPL-MCNC: 88 MG/DL (ref 75–140)
HCT VFR BLD AUTO: 30.1 % (ref 37–47)
HGB BLD-MCNC: 10 G/DL (ref 12–16)
IMM GRANULOCYTES # BLD: 0.1 K/UL
LYMPHOCYTES # BLD: 2.3 K/UL (ref 1.1–4.5)
LYMPHOCYTES NFR BLD: 20.7 % (ref 20–40)
MCH RBC QN AUTO: 30.9 PG (ref 27–31)
MCHC RBC AUTO-ENTMCNC: 33.2 G/DL (ref 33–37)
MCV RBC AUTO: 92.9 FL (ref 81–99)
MONOCYTES # BLD: 0.5 K/UL (ref 0–0.9)
MONOCYTES NFR BLD: 4.3 % (ref 0–10)
NEUTROPHILS # BLD: 8.2 K/UL (ref 1.5–7.5)
NEUTS SEG NFR BLD: 73.2 % (ref 50–65)
PLATELET # BLD AUTO: 235 K/UL (ref 130–400)
PMV BLD AUTO: 10.2 FL (ref 9.4–12.3)
RBC # BLD AUTO: 3.24 M/UL (ref 4.2–5.4)
WBC # BLD AUTO: 11.2 K/UL (ref 4.8–10.8)

## 2024-04-25 PROCEDURE — 0502F SUBSEQUENT PRENATAL CARE: CPT | Performed by: NURSE PRACTITIONER

## 2024-04-25 PROCEDURE — S3005 EVAL SELF-ASSESS DEPRESSION: HCPCS | Performed by: NURSE PRACTITIONER

## 2024-04-25 NOTE — PROGRESS NOTES
CNM Prenatal Office Note  Subjective:  Matthew Rivas is here for a return obstetrical visit. Today she is 28w3d weeks EGA. She is doing well, taking her PNV as directed, and has no complaints.  She  does not have vaginal bleeding, n/v, syncope, or headaches.  Pt does feel fetal movement regularly. 1 hour GCT today. Rhogam NI  Objective:  Mother's Prenatal Vitals  BP: 95/65  Weight - Scale: 73.5 kg (162 lb)  Pulse: 88  Patient Position: Sitting  Prenatal Fetal Information  Fetal HR: 125- u/s  Movement: Present  Pt is A&Ox3, in no acute distress. Normocephalic, atraumatic. PERRL. Resp even and non-labored. Skin pink, warm & dry. Gravid abdomen. WONG's well. Gait steady. EPDS Screenin  Assessment:    IUP at 28w3d wks      Diagnosis Orders   1. 28 weeks gestation of pregnancy  RPR      2. Encounter for supervision of normal first pregnancy in third trimester        3. Encounter for screening for maternal depression  MT EVAL SELF-ASSESS DEPRESSION      4. Encounter for antepartum consultation regarding lactation          Plan:  Third trimester teaching completed including warning signs for pre-eclampsia (blurred vision, seeing spots/sparkles, sudden increased weight gain or profound edema, epigastric pain), FKC (decreased fetal movments),  labor ( contractions or watery discharge, vaginal bleeding, cramping), n/v, chills, and or fever. Instructed pt to come to office or go to LDR if these symptoms presented. Pt voiced understanding.     Pt counseled on balanced nutrition, adequate fluid intake, taking PNV daily, and exercise along with GHTN precautions, Kick count, and  labor  Continue with routine prenatal care.  No follow-ups on file.       MEDICATIONS:  No orders of the defined types were placed in this encounter.    ORDERS:  Orders Placed This Encounter   Procedures    RPR    MT EVAL SELF-ASSESS DEPRESSION       More than 50% of this 20 min visit was education and counseling.

## 2024-04-25 NOTE — PATIENT INSTRUCTIONS
Patient Education        Weeks 26 to 30 of Your Pregnancy: Care Instructions  You're starting your last trimester. You'll probably feel your baby moving around more. Your back may ache as your body gets used to your baby's size and length. Take care of yourself, and pay attention to what your body needs.    Talk to your doctor about getting the Tdap shot. It will help protect your  against whooping cough (pertussis). Also ask your doctor about flu and COVID-19 shots if you haven't had them yet. If your blood type is Rh negative, you may be given a shot of Rh immune globulin (such as RhoGAM). It can help prevent problems for your baby.    You may have [place] contractions. They are single or several strong contractions without a pattern. These are practice contractions but not the start of labor.  Be kind to yourself.     Take breaks when you're tired.  Change positions often. Don't sit for too long or stand for too long.  At work, rest during breaks if you can. If you don't get breaks, talk to your doctor about writing a letter to your employer to request them.  Avoid fumes, chemicals, and tobacco smoke.  Be sexual if you want to.     You may be interested in sex, or you may not. Everyone is different.  Sex is okay unless your doctor tells you not to.  Your belly can make it hard to find good positions for sex. [place] and explore.  Watch for signs of  labor.    These signs include:   Menstrual-like cramps. Or you may have pain or pressure in your pelvis that happens in a pattern.  About 6 or more contractions in an hour (even after rest and a glass of water).  A low, dull backache that doesn't go away when you change positions.  An increase or change in vaginal discharge.  Light vaginal bleeding or spotting.  Your water breaking.  Know what to do if you think you are having contractions.     Drink 1 or 2 glasses of water.  Lie down on your left side for at least an hour.  While on your side,

## 2024-04-25 NOTE — PROGRESS NOTES
Pt is here for routine parental care. Pt denies vaginal bleeding, cramping or leaking of fluid. Pt states + fetal movement. She is going through her insurance for breast pump.

## 2024-04-29 LAB — RPR SER QL: NORMAL

## 2024-05-09 ENCOUNTER — ROUTINE PRENATAL (OUTPATIENT)
Dept: OBGYN CLINIC | Age: 24
End: 2024-05-09

## 2024-05-09 VITALS
BODY MASS INDEX: 30.8 KG/M2 | WEIGHT: 163 LBS | HEART RATE: 106 BPM | SYSTOLIC BLOOD PRESSURE: 106 MMHG | DIASTOLIC BLOOD PRESSURE: 70 MMHG

## 2024-05-09 DIAGNOSIS — Z36.89 ENCOUNTER FOR ULTRASOUND TO CHECK FETAL GROWTH: ICD-10-CM

## 2024-05-09 DIAGNOSIS — O99.013 ANEMIA DURING PREGNANCY IN THIRD TRIMESTER: ICD-10-CM

## 2024-05-09 DIAGNOSIS — Z34.03 ENCOUNTER FOR SUPERVISION OF NORMAL FIRST PREGNANCY IN THIRD TRIMESTER: ICD-10-CM

## 2024-05-09 DIAGNOSIS — Z3A.30 30 WEEKS GESTATION OF PREGNANCY: Primary | ICD-10-CM

## 2024-05-09 PROCEDURE — 0502F SUBSEQUENT PRENATAL CARE: CPT | Performed by: NURSE PRACTITIONER

## 2024-05-09 NOTE — PATIENT INSTRUCTIONS
Patient Education        Weeks 30 to 32 of Your Pregnancy: Care Instructions  Your baby is growing more every day. Its eyes can open and close, and it may have hair on its head. Your baby may sleep 20 to 45 minutes at a time and is more active at certain times.    You should feel your baby move several times every day. Your baby now turns less and kicks more.    This is a good time to tour your hospital or birthing center. You may also want to find childcare if needed.    To ease heartburn    Avoid foods that make your symptoms worse, such as chocolate, spicy foods, and caffeine.  Avoid bending over or lying down after meals.  Do not eat for 2 hours before bedtime.  Take antacids like Tums, but don't take ones that have sodium bicarbonate, magnesium trisilicate, or aspirin.    To care for large, swollen veins (varicose veins)    Try to avoid standing for long periods of time.  Sit with your feet propped up.  Wear support hose.  Get some exercise every day, like walking or swimming.  Counting your baby's kicks  Your doctor may ask you to count your baby's movements, such as kicks, flutters, or rolls.    Find a quiet place, and get comfortable. Write down your start time. Count your baby's movements (except hiccups). When your baby has moved 10 times, you can stop counting. Write down how many minutes it took.    If an hour goes by and you don't feel 10 movements, have something to eat or drink. Count for another hour. If you don't feel at least 10 movements in the 2-hour period, call your doctor.  Follow-up care is a key part of your treatment and safety. Be sure to make and go to all appointments, and call your doctor if you are having problems. It's also a good idea to know your test results and keep a list of the medicines you take.  Where can you learn more?  Go to https://www.[employer].net/patientEd and enter X471 to learn more about \"Weeks 30 to 32 of Your Pregnancy: Care Instructions.\"  Current as of: July

## 2024-05-09 NOTE — PROGRESS NOTES
[facility] Prenatal Office Note  Subjective:  Matthew Rivas is here for a return obstetrical visit. Today she is 30w3d weeks EGA. She is doing well, taking her PNV as directed, and has no complaints.  She  does not have vaginal bleeding, n/v, syncope, or headaches.  Pt does feel fetal movement regularly.    Objective:  Mother's Prenatal Vitals  BP: 106/70  Weight - Scale: 73.9 kg (163 lb)  Pulse: (!) 106  Patient Position: Sitting  Prenatal Fetal Information  Movement: Present  Pt is A&Ox3, in no acute distress. Normocephalic, atraumatic. PERRL. Resp even and non-labored. Skin pink, warm & dry. Gravid abdomen. WONG's well. Gait steady.   Assessment:    IUP at 30w3d wks      Diagnosis Orders   1. 30 weeks gestation of pregnancy        2. Encounter for supervision of normal first pregnancy in third trimester          Plan:  Pt counseled on balanced nutrition, adequate fluid intake, taking PNV daily, and exercise along with GHTN precautions, Kick count, and  labor  Continue with routine prenatal care.   surveillance not indicated  No follow-ups on file.     MEDICATIONS:  No orders of the defined types were placed in this encounter.    ORDERS:  No orders of the defined types were placed in this encounter.      More than 50% of this 20 min visit was education and counseling.        JESSICA Steinberg, am scribing for and in the presence of SINGH High.  24  2:16 PM CDT     I have seen and examined the patient independently.  I reviewed all laboratory and imaging studies that are relevant.  I have reviewed and made any appropriate changes to the HPI.    Electronically signed by SHANTI Albarado CNM on 24  at 4:32 PM CDT

## 2024-05-23 ENCOUNTER — ROUTINE PRENATAL (OUTPATIENT)
Dept: OBGYN CLINIC | Age: 24
End: 2024-05-23

## 2024-05-23 VITALS
BODY MASS INDEX: 30.8 KG/M2 | WEIGHT: 163 LBS | SYSTOLIC BLOOD PRESSURE: 96 MMHG | HEART RATE: 85 BPM | DIASTOLIC BLOOD PRESSURE: 65 MMHG

## 2024-05-23 DIAGNOSIS — Z3A.32 32 WEEKS GESTATION OF PREGNANCY: Primary | ICD-10-CM

## 2024-05-23 DIAGNOSIS — O99.013 ANEMIA DURING PREGNANCY IN THIRD TRIMESTER: ICD-10-CM

## 2024-05-23 DIAGNOSIS — Z34.03 ENCOUNTER FOR SUPERVISION OF NORMAL FIRST PREGNANCY IN THIRD TRIMESTER: ICD-10-CM

## 2024-05-23 PROCEDURE — 0502F SUBSEQUENT PRENATAL CARE: CPT | Performed by: NURSE PRACTITIONER

## 2024-05-23 NOTE — PROGRESS NOTES
Pt is here for routine parental care. Pt denies vaginal bleeding, cramping or leaking of fluid. Pt states + fetal movement. She is c/o about when she sleeps on her left side she gets a migraine that wakes her up this just started happening last couple of weeks.

## 2024-05-23 NOTE — PATIENT INSTRUCTIONS
Patient Education        Weeks 32 to 34 of Your Pregnancy: Care Instructions    Decide whether you want to bank or donate your baby's umbilical cord blood. If you want to save this blood, you have to arrange for it ahead of time.    Decide about circumcision. Personal, Confucianist, or cultural beliefs may play a role in your decision. You get to decide what you want for your baby.    Learn how to ease hemorrhoids.    Get more liquids, fruits, vegetables, and fiber in your diet.  Avoid sitting for too long.  Clean yourself with moist toilet paper. Or try witch hazel pads.  Try ice packs or warm sitz baths for discomfort.  Use hydrocortisone cream for pain or itching.  Ask your doctor about stool softeners.    Consider the benefits of breastfeeding.    It reduces your baby's risk of sudden infant death syndrome (SIDS).   babies are less likely to get certain infections. And they're less likely to be obese or get diabetes later in life.  It can lower your risk of breast and ovarian cancers and osteoporosis.  It saves you money.  Follow-up care is a key part of your treatment and safety. Be sure to make and go to all appointments, and call your doctor if you are having problems. It's also a good idea to know your test results and keep a list of the medicines you take.  Where can you learn more?  Go to https://www.[employer].net/patientEd and enter X711 to learn more about \"Weeks 32 to 34 of Your Pregnancy: Care Instructions.\"  Current as of: July 10, 2023               Content Version: 14.0  © 2006-2024 [employer].   Care instructions adapted under license by [employer]. If you have questions about a medical condition or this instruction, always ask your healthcare professional. [employer] disclaims any warranty or liability for your use of this information.       Patient Education        Counting Your Baby's Kicks: Care Instructions  Overview     Counting your baby's kicks is one

## 2024-05-23 NOTE — PROGRESS NOTES
[facility] Prenatal Office Note  Subjective:  Matthew Rivas is here for a return obstetrical visit. Today she is 32w3d weeks EGA. She is doing well, taking her PNV as directed, and has no complaints.  She  does not have vaginal bleeding, n/v, syncope, or headaches.  Pt does feel fetal movement regularly.    Objective:  Mother's Prenatal Vitals  BP: 96/65  Weight - Scale: 73.9 kg (163 lb)  Pulse: 85  Patient Position: Sitting  Prenatal Fetal Information  Movement: Present  Pt is A&Ox3, in no acute distress. Normocephalic, atraumatic. PERRL. Resp even and non-labored. Skin pink, warm & dry. Gravid abdomen. WONG's well. Gait steady.   Assessment:    IUP at 32w3d wks      Diagnosis Orders   1. 32 weeks gestation of pregnancy        2. Encounter for supervision of normal first pregnancy in third trimester        3. Anemia during pregnancy in third trimester          Plan:  Pt counseled on balanced nutrition, adequate fluid intake, taking PNV daily, and exercise along with GHTN precautions, Kick count, and  labor  Continue with routine prenatal care.   surveillance not indicated  Return in about 2 weeks (around 2024) for prenatal.   Pt has noticed when she lays on her left side she develops a migraine, it does resolve when she turns to her right side. Will continue to monitor to see if it resolves once baby is born.     MEDICATIONS:  No orders of the defined types were placed in this encounter.    ORDERS:  No orders of the defined types were placed in this encounter.      More than 50% of this 20 min visit was education and counseling.      JESSICA Steinberg, am scribing for and in the presence of SINGH High.  24  11:22 AM CDT     I have seen and examined the patient independently.  I reviewed all laboratory and imaging studies that are relevant.  I have reviewed and made any appropriate changes to the HPI.    Electronically signed by SHANTI Albarado CNM on 24  at 1:42

## 2024-05-29 PROBLEM — O99.013 ANEMIA DURING PREGNANCY IN THIRD TRIMESTER: Status: ACTIVE | Noted: 2024-03-06

## 2024-06-10 ENCOUNTER — ROUTINE PRENATAL (OUTPATIENT)
Dept: OBGYN CLINIC | Age: 24
End: 2024-06-10

## 2024-06-10 VITALS
SYSTOLIC BLOOD PRESSURE: 108 MMHG | BODY MASS INDEX: 31.74 KG/M2 | DIASTOLIC BLOOD PRESSURE: 71 MMHG | HEART RATE: 106 BPM | WEIGHT: 168 LBS

## 2024-06-10 DIAGNOSIS — Z3A.35 35 WEEKS GESTATION OF PREGNANCY: Primary | ICD-10-CM

## 2024-06-10 DIAGNOSIS — O99.013 ANEMIA DURING PREGNANCY IN THIRD TRIMESTER: ICD-10-CM

## 2024-06-10 DIAGNOSIS — Z34.03 ENCOUNTER FOR SUPERVISION OF NORMAL FIRST PREGNANCY IN THIRD TRIMESTER: ICD-10-CM

## 2024-06-10 DIAGNOSIS — Z36.85 ENCOUNTER FOR ANTENATAL SCREENING FOR STREPTOCOCCUS B: ICD-10-CM

## 2024-06-10 DIAGNOSIS — Z11.3 ENCOUNTER FOR SCREENING EXAMINATION FOR SEXUALLY TRANSMITTED DISEASE: ICD-10-CM

## 2024-06-10 LAB
C TRACH DNA CVX QL NAA+PROBE: NOT DETECTED
N GONORRHOEA DNA CERV MUCUS QL NAA+PROBE: NOT DETECTED
T VAGINALIS DNA GENITAL QL NAA+PROBE: NOT DETECTED

## 2024-06-10 NOTE — PROGRESS NOTES
[facility] Prenatal Office Note  Subjective:  Matthew Rivas is here for a return obstetrical visit. Today she is 35w0d weeks EGA. She is doing well, taking her PNV as directed, and has no complaints.  She  does not have vaginal bleeding, n/v, syncope, or headaches.  Pt does feel fetal movement regularly.    Objective:  Mother's Prenatal Vitals  BP: 108/71  Weight - Scale: 76.2 kg (168 lb)  Pulse: (!) 106  Patient Position: Sitting  Prenatal Fetal Information  Fetal HR:   Movement: Present  Cervical Exam  Presentation: Cephalic  Pt is A&Ox3, in no acute distress. Normocephalic, atraumatic. PERRL. Resp even and non-labored. Skin pink, warm & dry. Gravid abdomen. WONG's well. Gait steady.   Assessment:    IUP at 35w0d wks      Diagnosis Orders   1. 35 weeks gestation of pregnancy        2. Encounter for supervision of normal first pregnancy in third trimester        3. Anemia during pregnancy in third trimester        4. Encounter for  screening for Streptococcus B  Culture, Strep B Screen, Vaginal/Rectal      5. Encounter for screening examination for sexually transmitted disease  Chlamydia, Gonorrhea, Trichomoniasis Swab        Plan:  Pt counseled on balanced nutrition, adequate fluid intake, taking PNV daily, and exercise along with GHTN precautions, Kick count, and  labor  Continue with routine prenatal care.   surveillance indicated for anemia in preg  Bpp 88  Return in about 1 week (around 2024) for prenatal.     MEDICATIONS:  No orders of the defined types were placed in this encounter.    ORDERS:  Orders Placed This Encounter   Procedures    Culture, Strep B Screen, Vaginal/Rectal    Chlamydia, Gonorrhea, Trichomoniasis Swab       More than 50% of this 20 min visit was education and counseling.      I JESSICA Flores, am scribing for and in the presence of SINGH High.  6/10/24  10:41 AM CDT     I have seen and examined the patient independently.  I reviewed all

## 2024-06-10 NOTE — PATIENT INSTRUCTIONS
Patient Education        Weeks 34 to 36 of Your Pregnancy: Care Instructions  Your belly has grown quite large. It's almost time to give birth! Your baby's lungs are almost ready to breathe air. The skull bones are firm enough to protect your baby's head. But they're soft enough to move down through the birth canal.    You might be wondering what to expect during labor. Because each birth is different, there's no way to know exactly what childbirth will be like for you. Talk to your doctor or midwife about any concerns you have.   You'll probably have a test for group B streptococcus (GBS). GBS is bacteria that can live in the vagina and rectum. GBS can make your baby sick after birth. If you test positive, you'll get antibiotics during labor.     Choose what type of pain relief you would like during labor.  You can choose from a few types, including medicine and non-medicine options. You may want to use several types of pain relief.     Know how medicines can help with pain during labor.  Some medicines lower anxiety and help with some of the pain. Others make your lower body numb so that you will feel less pain.     Tell your doctor about your pain medicine choice.  Do this before you start labor or very early in your labor. You may be able to change your mind during labor.     Learn about the stages of labor.    The first stage includes the early (latent) and active phases of labor. Contractions start in early labor. During active labor, contractions get stronger, last longer, and happen more often. And the cervix opens more rapidly.  The second stage starts when you're ready to push. During this stage, your baby is born.  During the third stage, you'll usually have a few more contractions to push out the placenta.   Follow-up care is a key part of your treatment and safety. Be sure to make and go to all appointments, and call your doctor if you are having problems. It's also a good idea to know your test results

## 2024-06-10 NOTE — PROGRESS NOTES
Pt is here for routine parental care. Pt denies vaginal bleeding, cramping or leaking of fluid. Pt states + fetal movement. She mentions having several valencia choudhury.

## 2024-06-11 DIAGNOSIS — Z3A.35 35 WEEKS GESTATION OF PREGNANCY: Primary | ICD-10-CM

## 2024-06-11 LAB — GP B STREP DNA SPEC QL NAA+PROBE: NOT DETECTED

## 2024-06-14 DIAGNOSIS — Z3A.35 35 WEEKS GESTATION OF PREGNANCY: Primary | ICD-10-CM

## 2024-06-14 DIAGNOSIS — Z3A.35 35 WEEKS GESTATION OF PREGNANCY: ICD-10-CM

## 2024-06-17 ENCOUNTER — ROUTINE PRENATAL (OUTPATIENT)
Dept: OBGYN CLINIC | Age: 24
End: 2024-06-17

## 2024-06-17 VITALS
HEART RATE: 98 BPM | DIASTOLIC BLOOD PRESSURE: 70 MMHG | BODY MASS INDEX: 31.55 KG/M2 | WEIGHT: 167 LBS | SYSTOLIC BLOOD PRESSURE: 107 MMHG

## 2024-06-17 DIAGNOSIS — O99.013 ANEMIA DURING PREGNANCY IN THIRD TRIMESTER: ICD-10-CM

## 2024-06-17 DIAGNOSIS — Z34.03 ENCOUNTER FOR SUPERVISION OF NORMAL FIRST PREGNANCY IN THIRD TRIMESTER: ICD-10-CM

## 2024-06-17 DIAGNOSIS — Z3A.36 36 WEEKS GESTATION OF PREGNANCY: Primary | ICD-10-CM

## 2024-06-17 PROCEDURE — 0502F SUBSEQUENT PRENATAL CARE: CPT | Performed by: NURSE PRACTITIONER

## 2024-06-17 NOTE — PROGRESS NOTES
[facility] Prenatal Office Note  Subjective:  Matthew Rivas is here for a return obstetrical visit. Today she is 36w0d weeks EGA. She is doing well, taking her PNV as directed, and has no complaints.  She  does not have vaginal bleeding, n/v, syncope, or headaches.  Pt does feel fetal movement regularly.    Objective:  Mother's Prenatal Vitals  BP: 107/70  Weight - Scale: 75.8 kg (167 lb)  Pulse: 98  Patient Position: Sitting  Prenatal Fetal Information  Movement: Present  Pt is A&Ox3, in no acute distress. Normocephalic, atraumatic. PERRL. Resp even and non-labored. Skin pink, warm & dry. Gravid abdomen. WONG's well. Gait steady.   Assessment:    IUP at 36w0d wks      Diagnosis Orders   1. 36 weeks gestation of pregnancy        2. Encounter for supervision of normal first pregnancy in third trimester        3. Anemia during pregnancy in third trimester          Plan:  Pt counseled on balanced nutrition, adequate fluid intake, taking PNV daily, and exercise along with GHTN precautions, Kick count, and  labor  Continue with routine prenatal care.   surveillance not indicated  Return in about 1 week (around 2024) for prenatal.     MEDICATIONS:  No orders of the defined types were placed in this encounter.    ORDERS:  No orders of the defined types were placed in this encounter.      More than 50% of this 20 min visit was education and counseling.    JESSICA Steinberg, am scribing for and in the presence of SINGH High.  24  9:46 AM CDT     I have seen and examined the patient independently.  I reviewed all laboratory and imaging studies that are relevant.  I have reviewed and made any appropriate changes to the HPI.    Electronically signed by SHANTI Albarado CNM on 24  at 10:45 AM CDT

## 2024-06-17 NOTE — PATIENT INSTRUCTIONS
fetal movements.  When should you call for help?   Call your doctor now or seek immediate medical care if:    You feel fewer than 10 movements in a 2-hour period.     You noticed that your baby has stopped moving or is moving less than normal.   Watch closely for changes in your health, and be sure to contact your doctor if you have any problems.  Where can you learn more?  Go to https://www.[employer].net/patientEd and enter U048 to learn more about \"Counting Your Baby's Kicks: Care Instructions.\"  Current as of: July 10, 2023  Content Version: 14.1  © 9924-5652 [employer].   Care instructions adapted under license by [employer]. If you have questions about a medical condition or this instruction, always ask your healthcare professional. [employer] disclaims any warranty or liability for your use of this information.

## 2024-06-24 ENCOUNTER — ROUTINE PRENATAL (OUTPATIENT)
Dept: OBGYN CLINIC | Age: 24
End: 2024-06-24

## 2024-06-24 VITALS
WEIGHT: 163.6 LBS | DIASTOLIC BLOOD PRESSURE: 66 MMHG | HEART RATE: 88 BPM | BODY MASS INDEX: 30.91 KG/M2 | SYSTOLIC BLOOD PRESSURE: 104 MMHG

## 2024-06-24 DIAGNOSIS — Z34.03 ENCOUNTER FOR SUPERVISION OF NORMAL FIRST PREGNANCY IN THIRD TRIMESTER: Primary | ICD-10-CM

## 2024-06-24 DIAGNOSIS — O99.013 ANEMIA DURING PREGNANCY IN THIRD TRIMESTER: ICD-10-CM

## 2024-06-24 DIAGNOSIS — Z3A.37 37 WEEKS GESTATION OF PREGNANCY: ICD-10-CM

## 2024-06-24 PROCEDURE — 0502F SUBSEQUENT PRENATAL CARE: CPT | Performed by: NURSE PRACTITIONER

## 2024-06-24 NOTE — PROGRESS NOTES
[facility] Prenatal Office Note  Subjective:  Matthew Rivas is here for a return obstetrical visit. Today she is 37w0d weeks EGA. She is doing well, taking her PNV as directed, and has no complaints.  She  does not have vaginal bleeding, n/v, syncope, or headaches.  Pt does feel fetal movement regularly.    Objective:  Mother's Prenatal Vitals  BP: 104/66  Weight - Scale: 74.2 kg (163 lb 9.6 oz)  Pulse: 88  Patient Position: Sitting  Prenatal Fetal Information  Fundal Height (cm): 37 cm  Fetal HR: 142  Movement: Present  Cervical Exam  Dilation (cm): 1  Effacement: 40  Dil/Eff/Sta  Dilation (cm): 1  Effacement: 40  Pt is A&Ox3, in no acute distress. Normocephalic, atraumatic. PERRL. Resp even and non-labored. Skin pink, warm & dry. Gravid abdomen. WONG's well. Gait steady.   Assessment:    IUP at 37w0d wks      Diagnosis Orders   1. Encounter for supervision of normal first pregnancy in third trimester        2. Anemia during pregnancy in third trimester        3. 37 weeks gestation of pregnancy          Plan:  Pt counseled on balanced nutrition, adequate fluid intake, taking PNV daily, and exercise along with GHTN precautions, Kick count, and  labor  Continue with routine prenatal care.   surveillance not indicated  Return in about 1 week (around 2024).     MEDICATIONS:  No orders of the defined types were placed in this encounter.    ORDERS:  No orders of the defined types were placed in this encounter.      More than 50% of this 20 min visit was education and counseling.        JESSICA Steinebrg, am scribing for and in the presence of SINGH High.  24  9:43 AM CDT     I have seen and examined the patient independently.  I reviewed all laboratory and imaging studies that are relevant.  I have reviewed and made any appropriate changes to the HPI.    Electronically signed by SHANTI Albarado CNM on 24  at 12:10 AM CDT

## 2024-07-02 ENCOUNTER — HOSPITAL ENCOUNTER (OUTPATIENT)
Age: 24
Discharge: HOME | End: 2024-07-02
Attending: NURSE PRACTITIONER | Admitting: NURSE PRACTITIONER
Payer: COMMERCIAL

## 2024-07-02 VITALS
OXYGEN SATURATION: 98 % | TEMPERATURE: 98.6 F | RESPIRATION RATE: 16 BRPM | DIASTOLIC BLOOD PRESSURE: 75 MMHG | HEART RATE: 109 BPM | SYSTOLIC BLOOD PRESSURE: 112 MMHG

## 2024-07-02 PROBLEM — Z3A.38 38 WEEKS GESTATION OF PREGNANCY: Status: ACTIVE | Noted: 2024-07-02

## 2024-07-02 LAB
AMNISURE, POC: NEGATIVE
Lab: NORMAL
NEGATIVE QC PASS/FAIL: NORMAL
POSITIVE QC PASS/FAIL: NORMAL

## 2024-07-02 PROCEDURE — 84112 EVAL AMNIOTIC FLUID PROTEIN: CPT

## 2024-07-02 PROCEDURE — 99212 OFFICE O/P EST SF 10 MIN: CPT

## 2024-07-02 NOTE — FLOWSHEET NOTE
Pt ambulated to L&D at this time with a strong steady gait. Pt reports that she has been leaking fluid since yesterday. Pt reports that she has been miri all morning but the contractions are not consistent. Pt reports + fetal movement. Pt denies vaginal bleeding. EFM and [place] applied to soft non tender abdomen.

## 2024-07-02 NOTE — FLOWSHEET NOTE
This RN at the bedside at this time. Pt educated on discharge education and labor precautions. Pt educated to keep follow up appointment. Pt verbalized understanding.

## 2024-07-02 NOTE — DISCHARGE INSTRUCTIONS
LABOR AND DELIVERY - OBSERVATION DISCHARGE INSTRUCTIONS    TERM LABOR: RETURN TO HOSPITAL IF:  __There is a leaking or sudden gush of fluid from the vagina (note color, odor, time and amount of fluid)    __ You have bright red vaginal bleeding    __You begin to have regular contractions, occuring every 4-5 minutes, each contraction lasting 45-60 seconds for one hour. Time contractions from beginning of one to the beginning of the next. True contractions have a regular rate and become progressively closer. They are not changed by position change and are usually more uncomfortable when walking.      OTHER INSTRUCTIONS  __Make an appointment to see your attending physician   __After today's visit, you will need to return to registration and pre register for your next visit     NOTE: If you do not begin to feel better, or you have any questions, contact your physician or call [phone], or return to the hospital.

## 2024-07-02 NOTE — FLOWSHEET NOTE
This RN called and spoke to JAVID Diaz CNM at this time regarding pt arrival. Per JAVID Diaz CNM orders were given to discharge pt at this time.

## 2024-07-03 ENCOUNTER — ROUTINE PRENATAL (OUTPATIENT)
Dept: OBGYN CLINIC | Age: 24
End: 2024-07-03

## 2024-07-03 VITALS
DIASTOLIC BLOOD PRESSURE: 71 MMHG | WEIGHT: 163 LBS | HEART RATE: 92 BPM | BODY MASS INDEX: 30.8 KG/M2 | SYSTOLIC BLOOD PRESSURE: 104 MMHG

## 2024-07-03 DIAGNOSIS — Z34.03 ENCOUNTER FOR SUPERVISION OF NORMAL FIRST PREGNANCY IN THIRD TRIMESTER: ICD-10-CM

## 2024-07-03 DIAGNOSIS — O99.013 ANEMIA DURING PREGNANCY IN THIRD TRIMESTER: ICD-10-CM

## 2024-07-03 DIAGNOSIS — Z3A.38 38 WEEKS GESTATION OF PREGNANCY: Primary | ICD-10-CM

## 2024-07-03 PROCEDURE — 0502F SUBSEQUENT PRENATAL CARE: CPT | Performed by: NURSE PRACTITIONER

## 2024-07-03 NOTE — PROGRESS NOTES
Pt is here for routine parental care. Pt denies vaginal bleeding, cramping or leaking of fluid. Pt states + fetal movement. She was in yesterday thought her water broke.

## 2024-07-03 NOTE — PROGRESS NOTES
[facility] Prenatal Office Note  Subjective:  Matthew Rivas is here for a return obstetrical visit. Today she is 38w2d weeks EGA. She is doing well, taking her PNV as directed, and has no complaints.  She  does not have vaginal bleeding, n/v, syncope, or headaches.  Pt does feel fetal movement regularly.    Objective:  Mother's Prenatal Vitals  BP: 104/71  Weight - Scale: 73.9 kg (163 lb)  Pulse: 92  Patient Position: Sitting  Prenatal Fetal Information  Movement: Present  Pt is A&Ox3, in no acute distress. Normocephalic, atraumatic. PERRL. Resp even and non-labored. Skin pink, warm & dry. Gravid abdomen. WONG's well. Gait steady.   Assessment:    IUP at 38w2d wks      Diagnosis Orders   1. 38 weeks gestation of pregnancy        2. Anemia during pregnancy in third trimester        3. Encounter for supervision of normal first pregnancy in third trimester          Plan:  Pt counseled on balanced nutrition, adequate fluid intake, taking PNV daily, and exercise along with labor precautions, GHTN precautions, Kick count, and  labor  Continue with routine prenatal care.   surveillance not indicated  IOL next week discussed, undecided  Return in about 1 week (around 7/10/2024) for prenatal.     MEDICATIONS:  No orders of the defined types were placed in this encounter.    ORDERS:  No orders of the defined types were placed in this encounter.      More than 50% of this 20 min visit was education and counseling.        JESSICA Steinberg, am scribing for and in the presence of SINGH High.  7/3/24  10:17 AM CDT     I have seen and examined the patient independently.  I reviewed all laboratory and imaging studies that are relevant.  I have reviewed and made any appropriate changes to the HPI.    Electronically signed by SHANTI Albarado CNM on 7/3/24  at 5:40 PM CDT

## 2024-07-03 NOTE — PATIENT INSTRUCTIONS
Patient Education        Week 38 of Your Pregnancy: Care Instructions  Believe it or not, your baby is almost here. You may notice how your baby responds to sounds, warmth, cold, and light. You may even know what kind of music your baby likes.    Even if you expect a vaginal birth, it's a good idea to learn about  section ().  is the delivery of a baby through a cut (incision) in your belly. It's a major surgery, so it has more risks than a vaginal birth.   During the first 2 weeks after the birth, limit visitors. Don't allow visitors who have colds or infections. Ask visitors to wash their hands before they touch your baby. And never let anyone smoke around your baby.     Know about unplanned C-sections.  Reasons for an unplanned  include labor that slows or stops, signs of distress in your baby, and high blood pressure or other problems for you.     Know about planned C-sections.  If your baby isn't in a head-down position for delivery (breech position), your doctor may plan a . Or you may have a planned  if you're having twins or more.     Get as much help as you can while you're in the hospital.  You can learn about feeding, diapering, and bathing your baby.     Talk to your doctor or midwife about how to care for the umbilical cord stump.  If your baby will be circumcised, also ask about how to care for that.     Ask friends or family for help, as you need it.  If you can, have another adult in your home for at least 2 or 3 days after the birth.     Try to nap when your baby naps.  This may be your best chance to get some sleep.     Watch for changes in your mental health.  For the first 1 to 2 weeks after birth, it's common to cry or feel sad or irritable. If these feelings last for more than 2 weeks, you may have postpartum depression. Ask your doctor for help. Postpartum depression can be treated.   Follow-up care is a key part of your treatment and

## 2024-07-09 ENCOUNTER — APPOINTMENT (OUTPATIENT)
Dept: LABOR AND DELIVERY | Age: 24
End: 2024-07-09
Payer: COMMERCIAL

## 2024-07-09 ENCOUNTER — HOSPITAL ENCOUNTER (INPATIENT)
Age: 24
LOS: 4 days | Discharge: HOME | End: 2024-07-13
Attending: NURSE PRACTITIONER | Admitting: NURSE PRACTITIONER
Payer: COMMERCIAL

## 2024-07-09 DIAGNOSIS — Z98.891 S/P C-SECTION: Primary | ICD-10-CM

## 2024-07-09 PROBLEM — Z3A.39 39 WEEKS GESTATION OF PREGNANCY: Status: ACTIVE | Noted: 2024-07-09

## 2024-07-09 LAB
ABO/RH: NORMAL
AMPHET UR QL SCN: NEGATIVE
ANTIBODY SCREEN: NORMAL
BARBITURATES UR QL SCN: NEGATIVE
BENZODIAZ UR QL SCN: NEGATIVE
BUPRENORPHINE URINE: NEGATIVE
CANNABINOIDS UR QL SCN: NEGATIVE
COCAINE UR QL SCN: NEGATIVE
DRUG SCREEN COMMENT UR-IMP: NORMAL
ERYTHROCYTE [DISTWIDTH] IN BLOOD BY AUTOMATED COUNT: 13.4 % (ref 11.5–14.5)
FENTANYL SCREEN, URINE: NEGATIVE
HCT VFR BLD AUTO: 31.6 % (ref 37–47)
HGB BLD-MCNC: 10.4 G/DL (ref 12–16)
MCH RBC QN AUTO: 29.1 PG (ref 27–31)
MCHC RBC AUTO-ENTMCNC: 32.9 G/DL (ref 33–37)
MCV RBC AUTO: 88.5 FL (ref 81–99)
METHADONE UR QL SCN: NEGATIVE
METHAMPHETAMINE, URINE: NEGATIVE
OPIATES UR QL SCN: NEGATIVE
OXYCODONE UR QL SCN: NEGATIVE
PCP UR QL SCN: NEGATIVE
PLATELET # BLD AUTO: 216 K/UL (ref 130–400)
PMV BLD AUTO: 10.3 FL (ref 9.4–12.3)
RBC # BLD AUTO: 3.57 M/UL (ref 4.2–5.4)
TRICYCLIC ANTIDEPRESSANTS, UR: NEGATIVE
WBC # BLD AUTO: 11.9 K/UL (ref 4.8–10.8)

## 2024-07-09 PROCEDURE — 86592 SYPHILIS TEST NON-TREP QUAL: CPT

## 2024-07-09 PROCEDURE — 86900 BLOOD TYPING SEROLOGIC ABO: CPT

## 2024-07-09 PROCEDURE — 6370000000 HC RX 637 (ALT 250 FOR IP): Performed by: NURSE PRACTITIONER

## 2024-07-09 PROCEDURE — 1220000000 HC SEMI PRIVATE OB R&B

## 2024-07-09 PROCEDURE — G0480 DRUG TEST DEF 1-7 CLASSES: HCPCS

## 2024-07-09 PROCEDURE — 36415 COLL VENOUS BLD VENIPUNCTURE: CPT

## 2024-07-09 PROCEDURE — 85027 COMPLETE CBC AUTOMATED: CPT

## 2024-07-09 PROCEDURE — 86901 BLOOD TYPING SEROLOGIC RH(D): CPT

## 2024-07-09 PROCEDURE — 86850 RBC ANTIBODY SCREEN: CPT

## 2024-07-09 PROCEDURE — 80307 DRUG TEST PRSMV CHEM ANLYZR: CPT

## 2024-07-09 RX ORDER — SODIUM CHLORIDE 0.9 % (FLUSH) 0.9 %
5-40 SYRINGE (ML) INJECTION PRN
Status: DISCONTINUED | OUTPATIENT
Start: 2024-07-09 | End: 2024-07-12

## 2024-07-09 RX ORDER — TERBUTALINE SULFATE 1 MG/ML
0.25 INJECTION, SOLUTION SUBCUTANEOUS
Status: ACTIVE | OUTPATIENT
Start: 2024-07-09 | End: 2024-07-10

## 2024-07-09 RX ORDER — SODIUM CHLORIDE, SODIUM LACTATE, POTASSIUM CHLORIDE, AND CALCIUM CHLORIDE .6; .31; .03; .02 G/100ML; G/100ML; G/100ML; G/100ML
500 INJECTION, SOLUTION INTRAVENOUS PRN
Status: DISCONTINUED | OUTPATIENT
Start: 2024-07-09 | End: 2024-07-12

## 2024-07-09 RX ORDER — METHYLERGONOVINE MALEATE 0.2 MG/ML
200 INJECTION INTRAVENOUS PRN
Status: DISCONTINUED | OUTPATIENT
Start: 2024-07-09 | End: 2024-07-13 | Stop reason: HOSPADM

## 2024-07-09 RX ORDER — TRANEXAMIC ACID 10 MG/ML
1000 INJECTION, SOLUTION INTRAVENOUS
Status: ACTIVE | OUTPATIENT
Start: 2024-07-09 | End: 2024-07-10

## 2024-07-09 RX ORDER — CARBOPROST TROMETHAMINE 250 UG/ML
250 INJECTION, SOLUTION INTRAMUSCULAR PRN
Status: DISCONTINUED | OUTPATIENT
Start: 2024-07-09 | End: 2024-07-13 | Stop reason: HOSPADM

## 2024-07-09 RX ORDER — SODIUM CHLORIDE, SODIUM LACTATE, POTASSIUM CHLORIDE, CALCIUM CHLORIDE 600; 310; 30; 20 MG/100ML; MG/100ML; MG/100ML; MG/100ML
INJECTION, SOLUTION INTRAVENOUS CONTINUOUS
Status: DISCONTINUED | OUTPATIENT
Start: 2024-07-09 | End: 2024-07-13 | Stop reason: HOSPADM

## 2024-07-09 RX ORDER — ONDANSETRON 2 MG/ML
4 INJECTION INTRAMUSCULAR; INTRAVENOUS EVERY 6 HOURS PRN
Status: DISCONTINUED | OUTPATIENT
Start: 2024-07-09 | End: 2024-07-11 | Stop reason: SDUPTHER

## 2024-07-09 RX ORDER — SODIUM CHLORIDE 9 MG/ML
25 INJECTION, SOLUTION INTRAVENOUS PRN
Status: DISCONTINUED | OUTPATIENT
Start: 2024-07-09 | End: 2024-07-12

## 2024-07-09 RX ORDER — MISOPROSTOL 200 UG/1
400 TABLET ORAL PRN
Status: DISCONTINUED | OUTPATIENT
Start: 2024-07-09 | End: 2024-07-12

## 2024-07-09 RX ORDER — SODIUM CHLORIDE 0.9 % (FLUSH) 0.9 %
5-40 SYRINGE (ML) INJECTION EVERY 12 HOURS SCHEDULED
Status: DISCONTINUED | OUTPATIENT
Start: 2024-07-09 | End: 2024-07-12

## 2024-07-09 RX ORDER — ONDANSETRON 4 MG/1
4 TABLET, ORALLY DISINTEGRATING ORAL EVERY 6 HOURS PRN
Status: DISCONTINUED | OUTPATIENT
Start: 2024-07-09 | End: 2024-07-12

## 2024-07-09 RX ADMIN — Medication 25 MCG: at 21:54

## 2024-07-10 ENCOUNTER — ANESTHESIA (OUTPATIENT)
Dept: LABOR AND DELIVERY | Age: 24
End: 2024-07-10
Payer: COMMERCIAL

## 2024-07-10 ENCOUNTER — ANESTHESIA EVENT (OUTPATIENT)
Dept: LABOR AND DELIVERY | Age: 24
End: 2024-07-10
Payer: COMMERCIAL

## 2024-07-10 PROCEDURE — 1220000000 HC SEMI PRIVATE OB R&B

## 2024-07-10 PROCEDURE — 2580000003 HC RX 258: Performed by: NURSE PRACTITIONER

## 2024-07-10 PROCEDURE — 3700000025 EPIDURAL BLOCK: Performed by: NURSE ANESTHETIST, CERTIFIED REGISTERED

## 2024-07-10 PROCEDURE — 6360000002 HC RX W HCPCS: Performed by: NURSE PRACTITIONER

## 2024-07-10 PROCEDURE — 6360000002 HC RX W HCPCS: Performed by: NURSE ANESTHETIST, CERTIFIED REGISTERED

## 2024-07-10 PROCEDURE — 6370000000 HC RX 637 (ALT 250 FOR IP): Performed by: NURSE PRACTITIONER

## 2024-07-10 RX ORDER — NALOXONE HYDROCHLORIDE 0.4 MG/ML
INJECTION, SOLUTION INTRAMUSCULAR; INTRAVENOUS; SUBCUTANEOUS PRN
Status: DISCONTINUED | OUTPATIENT
Start: 2024-07-10 | End: 2024-07-11

## 2024-07-10 RX ORDER — EPHEDRINE SULFATE/0.9% NACL/PF 25 MG/5 ML
10 SYRINGE (ML) INTRAVENOUS PRN
Status: DISCONTINUED | OUTPATIENT
Start: 2024-07-10 | End: 2024-07-11

## 2024-07-10 RX ORDER — EPHEDRINE SULFATE/0.9% NACL/PF 25 MG/5 ML
5 SYRINGE (ML) INTRAVENOUS PRN
Status: DISCONTINUED | OUTPATIENT
Start: 2024-07-12 | End: 2024-07-11

## 2024-07-10 RX ORDER — ROPIVACAINE HYDROCHLORIDE 2 MG/ML
8 INJECTION, SOLUTION EPIDURAL; INFILTRATION; PERINEURAL CONTINUOUS
Status: DISCONTINUED | OUTPATIENT
Start: 2024-07-10 | End: 2024-07-11

## 2024-07-10 RX ORDER — DEXMEDETOMIDINE HYDROCHLORIDE 100 UG/ML
INJECTION, SOLUTION INTRAVENOUS PRN
Status: DISCONTINUED | OUTPATIENT
Start: 2024-07-10 | End: 2024-07-11 | Stop reason: SDUPTHER

## 2024-07-10 RX ADMIN — Medication 1 MILLI-UNITS/MIN: at 13:45

## 2024-07-10 RX ADMIN — Medication 25 MCG: at 02:14

## 2024-07-10 RX ADMIN — DEXMEDETOMIDINE HYDROCHLORIDE 5 MCG: 100 INJECTION, SOLUTION, CONCENTRATE INTRAVENOUS at 17:43

## 2024-07-10 RX ADMIN — SODIUM CHLORIDE, SODIUM LACTATE, POTASSIUM CHLORIDE, AND CALCIUM CHLORIDE: 600; 310; 30; 20 INJECTION, SOLUTION INTRAVENOUS at 17:31

## 2024-07-10 RX ADMIN — DEXMEDETOMIDINE HYDROCHLORIDE 5 MCG: 100 INJECTION, SOLUTION, CONCENTRATE INTRAVENOUS at 17:48

## 2024-07-10 RX ADMIN — ROPIVACAINE HYDROCHLORIDE 2 ML: 2 INJECTION EPIDURAL; INFILTRATION; PERINEURAL at 17:38

## 2024-07-10 RX ADMIN — DEXMEDETOMIDINE HYDROCHLORIDE 5 MCG: 100 INJECTION, SOLUTION, CONCENTRATE INTRAVENOUS at 17:33

## 2024-07-10 RX ADMIN — Medication 25 MCG: at 04:18

## 2024-07-10 RX ADMIN — Medication 25 MCG: at 08:08

## 2024-07-10 RX ADMIN — Medication 25 MCG: at 00:17

## 2024-07-10 RX ADMIN — SODIUM CHLORIDE, SODIUM LACTATE, POTASSIUM CHLORIDE, AND CALCIUM CHLORIDE: 600; 310; 30; 20 INJECTION, SOLUTION INTRAVENOUS at 13:44

## 2024-07-10 RX ADMIN — ROPIVACAINE HYDROCHLORIDE 2 ML: 2 INJECTION EPIDURAL; INFILTRATION; PERINEURAL at 17:43

## 2024-07-10 RX ADMIN — ROPIVACAINE HYDROCHLORIDE 2 ML: 2 INJECTION EPIDURAL; INFILTRATION; PERINEURAL at 17:33

## 2024-07-10 RX ADMIN — Medication 25 MCG: at 06:18

## 2024-07-10 RX ADMIN — ROPIVACAINE HYDROCHLORIDE 2 ML: 2 INJECTION EPIDURAL; INFILTRATION; PERINEURAL at 17:48

## 2024-07-10 RX ADMIN — DEXMEDETOMIDINE HYDROCHLORIDE 5 MCG: 100 INJECTION, SOLUTION, CONCENTRATE INTRAVENOUS at 17:38

## 2024-07-10 RX ADMIN — ROPIVACAINE HYDROCHLORIDE 6 ML/HR: 2 INJECTION EPIDURAL; INFILTRATION; PERINEURAL at 17:52

## 2024-07-10 SDOH — ECONOMIC STABILITY: INCOME INSECURITY: HOW HARD IS IT FOR YOU TO PAY FOR THE VERY BASICS LIKE FOOD, HOUSING, MEDICAL CARE, AND HEATING?: NOT HARD AT ALL

## 2024-07-10 SDOH — ECONOMIC STABILITY: FOOD INSECURITY: WITHIN THE PAST 12 MONTHS, YOU WORRIED THAT YOUR FOOD WOULD RUN OUT BEFORE YOU GOT MONEY TO BUY MORE.: NEVER TRUE

## 2024-07-10 SDOH — ECONOMIC STABILITY: INCOME INSECURITY: IN THE PAST 12 MONTHS, HAS THE ELECTRIC, GAS, OIL, OR WATER COMPANY THREATENED TO SHUT OFF SERVICE IN YOUR HOME?: NO

## 2024-07-10 ASSESSMENT — PATIENT HEALTH QUESTIONNAIRE - PHQ9
SUM OF ALL RESPONSES TO PHQ9 QUESTIONS 1 & 2: 0
SUM OF ALL RESPONSES TO PHQ QUESTIONS 1-9: 0
2. FEELING DOWN, DEPRESSED OR HOPELESS: NOT AT ALL
1. LITTLE INTEREST OR PLEASURE IN DOING THINGS: NOT AT ALL

## 2024-07-10 NOTE — ANESTHESIA PROCEDURE NOTES
Epidural Block    Patient location during procedure: OB  Start time: 7/10/2024 5:17 PM  End time: 7/10/2024 5:32 PM  Reason for block: labor epidural  Staffing  Performed: resident/CRNA   Resident/CRNA: Estrellita Hand APRN - CRNA  Performed by: Estrellita Hand APRN - CRNA  Authorized by: Estrellita Hand APRN - CRNA    Epidural  Patient position: sitting  Prep: Betadine  Patient monitoring: continuous pulse ox and frequent blood pressure checks  Approach: midline  Location: L3-4  Injection technique: TENZIN saline  Guidance: landmark technique  Provider prep: mask and sterile gloves  Needle  Needle type: Tuohy   Needle gauge: 17 G  Needle length: 3.5 in  Needle insertion depth: 5.5 cm  Catheter type: end hole  Catheter size: 19 G  Catheter at skin depth: 12 cm  Test dose: negativeCatheter Secured: tegaderm and tape  Assessment  Sensory level: T8  Hemodynamics: stable  Attempts: 1  Outcomes: uncomplicated and patient tolerated procedure well  Additional Notes  Pt in sitting position with knees against back of bed, with baby between legs. Arms around pillow, curled around baby, OB rn in front of pt for support. Chin to chest. Hand hygiene completed. Sterile gloves applied. Field prepped with betadine with time allotted to dry. Drape utilized, sterile technique maintained. 3ml 1% lidocaine to create skinwheel at L3-L4 interspace. X1 attempt, TENZIN at depth noted, catheter threaded easily and without resistance to depth noted. - paresthesia, -heme, -csf. Test dose negative for perioral paresthesia or tinnitus. Boluses as noted in MAR. Clear occlusive dressings, silk tape outlining dressings. Continuous epidural pump as noted in MAR. Pain score decreased from 7/10 to 2/10. Level achieved T8    In room: 1708  Timeout: 1712  Betadine prep: 1717 (3 mins allotted to dry)  Skin wheel 3ml PF 1% lidocaine: 1721  Needle in: 1722  Catheter placement: 1728  Test dose 3ml PF 1.5% lidocaine with 1:200,000 epi: 1729  Bolus doses

## 2024-07-10 NOTE — DISCHARGE INSTRUCTIONS
[phone] -or- [phone]   [email or web address]    General Behavioral Health:  Medication Assisted Treatment for Opioid Use Disorders; MRT   Patients under 18 years old accepted   Accepts Medicaid; Medicare; Private Insurance; Sliding Scale    [facility]  [address] , [place] [address]  [phone]  [email or web address]   Evening Hours by Appointment  Services: Outpatient and intensive outpatient services for substance use disorders and mental health, medication management    [facility]  [address], [place], [place] [address] [phone] -or- [phone]   [email or web address]    General Behavioral Health:  Medication Assisted Treatment for Opioid Use Disorders; MRT   Patients under 18 years old accepted   Accepts Medicaid; Medicare; Private Insurance; Sliding Scale    [facility]  \" [address]; [place], [place] [address] [phone] option 9  [email or web address]   Patients under 18 years old accepted    Services: Addiction outpatient services, substance abuse professionals, corporate crisis response, workplace wellness, executive life-coaching  Accepts Medicaid, Medicare, Most Private Insurances  \" [place] [address] , [place], [place] [address] [phone] -or- [phone]   [email or web address]    General Behavioral Health:  Medication Assisted Treatment for Opioid Use Disorders; MRT   Patients under 18 years old accepted   Accepts Medicaid; Medicare; Private Insurance; Sliding Scale  \" [facility]  [address] [place], [place]  [phone]  [email or web address]   Services: Outpatient, intensive outpatient, and residential substance abuse treatment, mental health services, crisis services  \" [place]  yourself and those who care about you. Tell them about your struggle.  Join a support group of new parents. No one can better understand the challenges of caring for a  than other new parents.

## 2024-07-10 NOTE — CARE COORDINATION
Sara provided the Pt with community resources based on SDOH scores and any additional community resources needed. These resources will be provided to the Pt at D/C. Electronically signed by Sara Gutiérrez on 7/10/2024 at 7:56 AM

## 2024-07-10 NOTE — ANESTHESIA PRE PROCEDURE
0.9 % injection 5-40 mL  5-40 mL IntraVENous 2 times per day Niya Starkey APRN - CNM       • sodium chloride flush 0.9 % injection 5-40 mL  5-40 mL IntraVENous PRN Niya Starkey APRN - CNM       • 0.9 % sodium chloride infusion  25 mL IntraVENous PRN Niya Starkey APRN - CNM       • methylergonovine (METHERGINE) injection 200 mcg  200 mcg IntraMUSCular PRN Niya Starkey APRN - CNM       • carboprost (HEMABATE) injection 250 mcg  250 mcg IntraMUSCular PRN Niya Starkey APRN - CNM       • miSOPROStol (CYTOTEC) tablet 400 mcg  400 mcg Buccal PRN Niya Starkey APRN - CNM       • tranexamic acid-NaCl IVPB premix 1,000 mg  1,000 mg IntraVENous Once PRN Niya Starkey APRN - CNM       • oxytocin (PITOCIN) 30 units in 500 mL infusion  87.3 nolan-units/min IntraVENous Continuous PRN Niya Starkey APRN - CNM        And   • oxytocin (PITOCIN) 10 unit bolus from the bag  10 Units IntraVENous PRN Niya Starkey APRN - CNM       • terbutaline (BRETHINE) injection 0.25 mg  0.25 mg SubCUTAneous Once PRN Niya Starkey APRN - CNM       • ondansetron (ZOFRAN) injection 4 mg  4 mg IntraVENous Q6H PRN Niya Starkey APRN - CNM        Or   • ondansetron (ZOFRAN-ODT) disintegrating tablet 4 mg  4 mg Oral Q6H PRN Niya Starkey APRN - CNM           Allergies:    Allergies   Allergen Reactions   • Codeine      Panic attack        Problem List:    Patient Active Problem List   Diagnosis Code   • Menorrhagia N92.0   • Irregular menses N92.6   • Dysmenorrhea N94.6   • Anemia during pregnancy in third trimester O99.013   • 38 weeks gestation of pregnancy Z3A.38   • 39 weeks gestation of pregnancy Z3A.39       Past Medical History:        Diagnosis Date   • Palpitations        Past Surgical History:        Procedure Laterality Date   • ACHILLES TENDON SURGERY         Social History:    Social History     Tobacco Use   • Smoking status: Never   • Smokeless tobacco: Never   Substance Use

## 2024-07-11 ENCOUNTER — ANESTHESIA (OUTPATIENT)
Dept: LABOR AND DELIVERY | Age: 24
End: 2024-07-11
Payer: COMMERCIAL

## 2024-07-11 LAB — RPR SER QL: NORMAL

## 2024-07-11 PROCEDURE — 7100000000 HC PACU RECOVERY - FIRST 15 MIN: Performed by: OBSTETRICS & GYNECOLOGY

## 2024-07-11 PROCEDURE — 2580000003 HC RX 258: Performed by: NURSE ANESTHETIST, CERTIFIED REGISTERED

## 2024-07-11 PROCEDURE — 2709999900 HC NON-CHARGEABLE SUPPLY: Performed by: OBSTETRICS & GYNECOLOGY

## 2024-07-11 PROCEDURE — 6360000002 HC RX W HCPCS: Performed by: NURSE ANESTHETIST, CERTIFIED REGISTERED

## 2024-07-11 PROCEDURE — 2500000003 HC RX 250 WO HCPCS: Performed by: NURSE ANESTHETIST, CERTIFIED REGISTERED

## 2024-07-11 PROCEDURE — 3609079900 HC CESAREAN SECTION: Performed by: OBSTETRICS & GYNECOLOGY

## 2024-07-11 PROCEDURE — C1765 ADHESION BARRIER: HCPCS | Performed by: OBSTETRICS & GYNECOLOGY

## 2024-07-11 PROCEDURE — 7100000001 HC PACU RECOVERY - ADDTL 15 MIN: Performed by: OBSTETRICS & GYNECOLOGY

## 2024-07-11 PROCEDURE — 6370000000 HC RX 637 (ALT 250 FOR IP): Performed by: OBSTETRICS & GYNECOLOGY

## 2024-07-11 PROCEDURE — 2720000010 HC SURG SUPPLY STERILE: Performed by: OBSTETRICS & GYNECOLOGY

## 2024-07-11 PROCEDURE — 6360000002 HC RX W HCPCS: Performed by: NURSE PRACTITIONER

## 2024-07-11 PROCEDURE — 1220000000 HC SEMI PRIVATE OB R&B

## 2024-07-11 PROCEDURE — 2580000003 HC RX 258: Performed by: NURSE PRACTITIONER

## 2024-07-11 RX ORDER — KETOROLAC TROMETHAMINE 30 MG/ML
30 INJECTION, SOLUTION INTRAMUSCULAR; INTRAVENOUS EVERY 6 HOURS
Status: DISPENSED | OUTPATIENT
Start: 2024-07-11 | End: 2024-07-12

## 2024-07-11 RX ORDER — LIDOCAINE HCL/EPINEPHRINE/PF 2%-1:200K
VIAL (ML) INJECTION PRN
Status: DISCONTINUED | OUTPATIENT
Start: 2024-07-11 | End: 2024-07-11 | Stop reason: SDUPTHER

## 2024-07-11 RX ORDER — PRENATAL VIT/IRON FUM/FOLIC AC 27MG-0.8MG
1 TABLET ORAL DAILY
Status: DISCONTINUED | OUTPATIENT
Start: 2024-07-11 | End: 2024-07-13 | Stop reason: HOSPADM

## 2024-07-11 RX ORDER — ROPIVACAINE HYDROCHLORIDE 5 MG/ML
INJECTION, SOLUTION EPIDURAL; INFILTRATION; PERINEURAL PRN
Status: DISCONTINUED | OUTPATIENT
Start: 2024-07-11 | End: 2024-07-11 | Stop reason: SDUPTHER

## 2024-07-11 RX ORDER — CEFAZOLIN SODIUM 1 G/3ML
INJECTION, POWDER, FOR SOLUTION INTRAMUSCULAR; INTRAVENOUS PRN
Status: DISCONTINUED | OUTPATIENT
Start: 2024-07-11 | End: 2024-07-11 | Stop reason: SDUPTHER

## 2024-07-11 RX ORDER — HYDROMORPHONE HYDROCHLORIDE 1 MG/ML
1 INJECTION, SOLUTION INTRAMUSCULAR; INTRAVENOUS; SUBCUTANEOUS
Status: DISCONTINUED | OUTPATIENT
Start: 2024-07-11 | End: 2024-07-13 | Stop reason: HOSPADM

## 2024-07-11 RX ORDER — IBUPROFEN 400 MG/1
800 TABLET ORAL EVERY 8 HOURS
Status: DISCONTINUED | OUTPATIENT
Start: 2024-07-12 | End: 2024-07-13 | Stop reason: HOSPADM

## 2024-07-11 RX ORDER — SODIUM CHLORIDE 0.9 % (FLUSH) 0.9 %
5-40 SYRINGE (ML) INJECTION PRN
Status: DISCONTINUED | OUTPATIENT
Start: 2024-07-11 | End: 2024-07-13 | Stop reason: HOSPADM

## 2024-07-11 RX ORDER — TRANEXAMIC ACID 10 MG/ML
1000 INJECTION, SOLUTION INTRAVENOUS
Status: ACTIVE | OUTPATIENT
Start: 2024-07-11 | End: 2024-07-12

## 2024-07-11 RX ORDER — MISOPROSTOL 200 UG/1
400 TABLET ORAL PRN
Status: DISCONTINUED | OUTPATIENT
Start: 2024-07-11 | End: 2024-07-13 | Stop reason: HOSPADM

## 2024-07-11 RX ORDER — OXYCODONE HYDROCHLORIDE 10 MG/1
10 TABLET ORAL EVERY 4 HOURS PRN
Status: DISCONTINUED | OUTPATIENT
Start: 2024-07-11 | End: 2024-07-13 | Stop reason: HOSPADM

## 2024-07-11 RX ORDER — FERROUS SULFATE 325(65) MG
325 TABLET ORAL EVERY OTHER DAY
Status: DISCONTINUED | OUTPATIENT
Start: 2024-07-11 | End: 2024-07-12

## 2024-07-11 RX ORDER — DIPHENHYDRAMINE HYDROCHLORIDE 50 MG/ML
25 INJECTION INTRAMUSCULAR; INTRAVENOUS EVERY 6 HOURS PRN
Status: DISCONTINUED | OUTPATIENT
Start: 2024-07-11 | End: 2024-07-13 | Stop reason: HOSPADM

## 2024-07-11 RX ORDER — DOCUSATE SODIUM 100 MG/1
100 CAPSULE, LIQUID FILLED ORAL 2 TIMES DAILY
Status: DISCONTINUED | OUTPATIENT
Start: 2024-07-11 | End: 2024-07-13 | Stop reason: HOSPADM

## 2024-07-11 RX ORDER — ONDANSETRON 4 MG/1
4 TABLET, ORALLY DISINTEGRATING ORAL EVERY 8 HOURS PRN
Status: DISCONTINUED | OUTPATIENT
Start: 2024-07-11 | End: 2024-07-13 | Stop reason: HOSPADM

## 2024-07-11 RX ORDER — ACETAMINOPHEN 500 MG
1000 TABLET ORAL ONCE AS NEEDED
Status: DISCONTINUED | OUTPATIENT
Start: 2024-07-11 | End: 2024-07-12

## 2024-07-11 RX ORDER — FENTANYL CITRATE 50 UG/ML
INJECTION, SOLUTION INTRAMUSCULAR; INTRAVENOUS PRN
Status: DISCONTINUED | OUTPATIENT
Start: 2024-07-11 | End: 2024-07-11 | Stop reason: SDUPTHER

## 2024-07-11 RX ORDER — PHENYLEPHRINE HYDROCHLORIDE 10 MG/ML
INJECTION INTRAVENOUS PRN
Status: DISCONTINUED | OUTPATIENT
Start: 2024-07-11 | End: 2024-07-11 | Stop reason: SDUPTHER

## 2024-07-11 RX ORDER — OXYCODONE HYDROCHLORIDE 5 MG/1
5 TABLET ORAL EVERY 4 HOURS PRN
Status: DISCONTINUED | OUTPATIENT
Start: 2024-07-11 | End: 2024-07-13 | Stop reason: HOSPADM

## 2024-07-11 RX ORDER — SIMETHICONE 80 MG
80 TABLET,CHEWABLE ORAL EVERY 6 HOURS PRN
Status: DISCONTINUED | OUTPATIENT
Start: 2024-07-11 | End: 2024-07-13 | Stop reason: HOSPADM

## 2024-07-11 RX ORDER — ONDANSETRON 2 MG/ML
4 INJECTION INTRAMUSCULAR; INTRAVENOUS EVERY 6 HOURS PRN
Status: DISCONTINUED | OUTPATIENT
Start: 2024-07-11 | End: 2024-07-13 | Stop reason: HOSPADM

## 2024-07-11 RX ORDER — SODIUM CHLORIDE 9 MG/ML
INJECTION, SOLUTION INTRAVENOUS PRN
Status: DISCONTINUED | OUTPATIENT
Start: 2024-07-11 | End: 2024-07-13 | Stop reason: HOSPADM

## 2024-07-11 RX ORDER — ACETAMINOPHEN 500 MG
1000 TABLET ORAL EVERY 8 HOURS
Status: DISCONTINUED | OUTPATIENT
Start: 2024-07-12 | End: 2024-07-13 | Stop reason: HOSPADM

## 2024-07-11 RX ORDER — SODIUM CHLORIDE, SODIUM LACTATE, POTASSIUM CHLORIDE, CALCIUM CHLORIDE 600; 310; 30; 20 MG/100ML; MG/100ML; MG/100ML; MG/100ML
INJECTION, SOLUTION INTRAVENOUS CONTINUOUS PRN
Status: DISCONTINUED | OUTPATIENT
Start: 2024-07-11 | End: 2024-07-11 | Stop reason: SDUPTHER

## 2024-07-11 RX ORDER — ONDANSETRON 2 MG/ML
INJECTION INTRAMUSCULAR; INTRAVENOUS PRN
Status: DISCONTINUED | OUTPATIENT
Start: 2024-07-11 | End: 2024-07-11 | Stop reason: SDUPTHER

## 2024-07-11 RX ORDER — SODIUM CHLORIDE 0.9 % (FLUSH) 0.9 %
5-40 SYRINGE (ML) INJECTION EVERY 12 HOURS SCHEDULED
Status: DISCONTINUED | OUTPATIENT
Start: 2024-07-11 | End: 2024-07-13 | Stop reason: HOSPADM

## 2024-07-11 RX ORDER — HYDROMORPHONE HYDROCHLORIDE 1 MG/ML
0.5 INJECTION, SOLUTION INTRAMUSCULAR; INTRAVENOUS; SUBCUTANEOUS
Status: DISCONTINUED | OUTPATIENT
Start: 2024-07-11 | End: 2024-07-13 | Stop reason: HOSPADM

## 2024-07-11 RX ORDER — KETOROLAC TROMETHAMINE 30 MG/ML
INJECTION, SOLUTION INTRAMUSCULAR; INTRAVENOUS PRN
Status: DISCONTINUED | OUTPATIENT
Start: 2024-07-11 | End: 2024-07-11 | Stop reason: SDUPTHER

## 2024-07-11 RX ORDER — POLYETHYLENE GLYCOL 3350 17 G/17G
17 POWDER, FOR SOLUTION ORAL DAILY
Status: DISCONTINUED | OUTPATIENT
Start: 2024-07-11 | End: 2024-07-13 | Stop reason: HOSPADM

## 2024-07-11 RX ORDER — NALOXONE HYDROCHLORIDE 0.4 MG/ML
INJECTION, SOLUTION INTRAMUSCULAR; INTRAVENOUS; SUBCUTANEOUS PRN
Status: DISCONTINUED | OUTPATIENT
Start: 2024-07-11 | End: 2024-07-11 | Stop reason: HOSPADM

## 2024-07-11 RX ADMIN — ROPIVACAINE HYDROCHLORIDE 8 ML/HR: 2 INJECTION EPIDURAL; INFILTRATION; PERINEURAL at 15:06

## 2024-07-11 RX ADMIN — PHENYLEPHRINE HYDROCHLORIDE 50 MCG: 10 INJECTION INTRAVENOUS at 19:49

## 2024-07-11 RX ADMIN — FENTANYL CITRATE 100 MCG: 50 INJECTION, SOLUTION INTRAMUSCULAR; INTRAVENOUS at 10:08

## 2024-07-11 RX ADMIN — DEXMEDETOMIDINE HYDROCHLORIDE 5 MCG: 100 INJECTION, SOLUTION, CONCENTRATE INTRAVENOUS at 04:43

## 2024-07-11 RX ADMIN — ROPIVACAINE HYDROCHLORIDE 2 ML: 5 INJECTION, SOLUTION EPIDURAL; INFILTRATION; PERINEURAL at 04:43

## 2024-07-11 RX ADMIN — Medication 550 ML/HR: at 19:29

## 2024-07-11 RX ADMIN — LIDOCAINE HYDROCHLORIDE,EPINEPHRINE BITARTRATE 2.5 ML: 20; .005 INJECTION, SOLUTION EPIDURAL; INFILTRATION; INTRACAUDAL; PERINEURAL at 19:15

## 2024-07-11 RX ADMIN — CEFAZOLIN 2 G: 1 INJECTION, POWDER, FOR SOLUTION INTRAMUSCULAR; INTRAVENOUS at 19:07

## 2024-07-11 RX ADMIN — SODIUM CHLORIDE, SODIUM LACTATE, POTASSIUM CHLORIDE, AND CALCIUM CHLORIDE: 600; 310; 30; 20 INJECTION, SOLUTION INTRAVENOUS at 05:03

## 2024-07-11 RX ADMIN — FENTANYL CITRATE 25 MCG: 50 INJECTION, SOLUTION INTRAMUSCULAR; INTRAVENOUS at 19:09

## 2024-07-11 RX ADMIN — PHENYLEPHRINE HYDROCHLORIDE 50 MCG: 10 INJECTION INTRAVENOUS at 20:35

## 2024-07-11 RX ADMIN — LIDOCAINE HYDROCHLORIDE,EPINEPHRINE BITARTRATE 2.5 ML: 20; .005 INJECTION, SOLUTION EPIDURAL; INFILTRATION; INTRACAUDAL; PERINEURAL at 19:53

## 2024-07-11 RX ADMIN — SODIUM CHLORIDE, SODIUM LACTATE, POTASSIUM CHLORIDE, AND CALCIUM CHLORIDE: 600; 310; 30; 20 INJECTION, SOLUTION INTRAVENOUS at 18:57

## 2024-07-11 RX ADMIN — LIDOCAINE HYDROCHLORIDE,EPINEPHRINE BITARTRATE 2.5 ML: 20; .005 INJECTION, SOLUTION EPIDURAL; INFILTRATION; INTRACAUDAL; PERINEURAL at 19:05

## 2024-07-11 RX ADMIN — LIDOCAINE HYDROCHLORIDE,EPINEPHRINE BITARTRATE 2.5 ML: 20; .005 INJECTION, SOLUTION EPIDURAL; INFILTRATION; INTRACAUDAL; PERINEURAL at 19:57

## 2024-07-11 RX ADMIN — PHENYLEPHRINE HYDROCHLORIDE 50 MCG: 10 INJECTION INTRAVENOUS at 19:42

## 2024-07-11 RX ADMIN — FENTANYL CITRATE 25 MCG: 50 INJECTION, SOLUTION INTRAMUSCULAR; INTRAVENOUS at 19:15

## 2024-07-11 RX ADMIN — Medication 2 MILLI-UNITS/MIN: at 14:30

## 2024-07-11 RX ADMIN — DEXMEDETOMIDINE HYDROCHLORIDE 5 MCG: 100 INJECTION, SOLUTION, CONCENTRATE INTRAVENOUS at 04:38

## 2024-07-11 RX ADMIN — LIDOCAINE HYDROCHLORIDE,EPINEPHRINE BITARTRATE 2.5 ML: 20; .005 INJECTION, SOLUTION EPIDURAL; INFILTRATION; INTRACAUDAL; PERINEURAL at 19:09

## 2024-07-11 RX ADMIN — ONDANSETRON 4 MG: 2 INJECTION INTRAMUSCULAR; INTRAVENOUS at 19:06

## 2024-07-11 RX ADMIN — DEXMEDETOMIDINE HYDROCHLORIDE 5 MCG: 100 INJECTION, SOLUTION, CONCENTRATE INTRAVENOUS at 04:47

## 2024-07-11 RX ADMIN — PHENYLEPHRINE HYDROCHLORIDE 50 MCG: 10 INJECTION INTRAVENOUS at 19:45

## 2024-07-11 RX ADMIN — Medication 2 MILLI-UNITS/MIN: at 13:02

## 2024-07-11 RX ADMIN — SODIUM CHLORIDE, SODIUM LACTATE, POTASSIUM CHLORIDE, AND CALCIUM CHLORIDE: 600; 310; 30; 20 INJECTION, SOLUTION INTRAVENOUS at 19:50

## 2024-07-11 RX ADMIN — ROPIVACAINE HYDROCHLORIDE 2 ML: 5 INJECTION, SOLUTION EPIDURAL; INFILTRATION; PERINEURAL at 04:38

## 2024-07-11 RX ADMIN — KETOROLAC TROMETHAMINE 30 MG: 30 INJECTION, SOLUTION INTRAMUSCULAR; INTRAVENOUS at 19:50

## 2024-07-11 RX ADMIN — PHENYLEPHRINE HYDROCHLORIDE 50 MCG: 10 INJECTION INTRAVENOUS at 20:02

## 2024-07-11 RX ADMIN — OXYCODONE HYDROCHLORIDE 10 MG: 10 TABLET ORAL at 22:30

## 2024-07-11 RX ADMIN — ROPIVACAINE HYDROCHLORIDE 2 ML: 5 INJECTION, SOLUTION EPIDURAL; INFILTRATION; PERINEURAL at 04:47

## 2024-07-11 ASSESSMENT — PAIN DESCRIPTION - DESCRIPTORS: DESCRIPTORS: DISCOMFORT

## 2024-07-11 ASSESSMENT — PAIN SCALES - GENERAL: PAINLEVEL_OUTOF10: 5

## 2024-07-11 ASSESSMENT — PAIN DESCRIPTION - LOCATION: LOCATION: ABDOMEN;INCISION

## 2024-07-11 ASSESSMENT — PAIN DESCRIPTION - ORIENTATION: ORIENTATION: LOWER

## 2024-07-11 NOTE — PROGRESS NOTES
Called at 0412 by Marlen pt primary OB RN to evaluate pt epidural. Pt with increasing discomfort since water broke at approx 2am.  At bedside at 0422. Epidural insertion site cdi with catheter insertion depth unchanged. Pt with decreased depth of blockade, dermatone level bilateral T10. Pain level 7/10 described as increased pressure overall and sharp sensation to vaginal area, discomfort with varela cath. Bolus medication as documented in MAR. Pain decreased to 2/10, described as 'only pressure during contractions'

## 2024-07-11 NOTE — FLOWSHEET NOTE
Pt moved to room 218 via bed per this RN. Pt moved to labor bed. Room orientation complete. Questions and concerns addressed. Call light within reach.

## 2024-07-11 NOTE — FLOWSHEET NOTE
This RN at the bedside at this time. This RN repositioned pt. Saturated towel noted from [employer]. Towel replaced with a fresh towel.

## 2024-07-12 ENCOUNTER — ANESTHESIA EVENT (OUTPATIENT)
Dept: LABOR AND DELIVERY | Age: 24
End: 2024-07-12
Payer: COMMERCIAL

## 2024-07-12 LAB
ERYTHROCYTE [DISTWIDTH] IN BLOOD BY AUTOMATED COUNT: 14 % (ref 11.5–14.5)
HCT VFR BLD AUTO: 26.4 % (ref 37–47)
HGB BLD-MCNC: 8.8 G/DL (ref 12–16)
MCH RBC QN AUTO: 29.9 PG (ref 27–31)
MCHC RBC AUTO-ENTMCNC: 33.3 G/DL (ref 33–37)
MCV RBC AUTO: 89.8 FL (ref 81–99)
PLATELET # BLD AUTO: 191 K/UL (ref 130–400)
PMV BLD AUTO: 11.5 FL (ref 9.4–12.3)
RBC # BLD AUTO: 2.94 M/UL (ref 4.2–5.4)
WBC # BLD AUTO: 22.5 K/UL (ref 4.8–10.8)

## 2024-07-12 PROCEDURE — 6370000000 HC RX 637 (ALT 250 FOR IP): Performed by: OBSTETRICS & GYNECOLOGY

## 2024-07-12 PROCEDURE — 2580000003 HC RX 258: Performed by: OBSTETRICS & GYNECOLOGY

## 2024-07-12 PROCEDURE — 36415 COLL VENOUS BLD VENIPUNCTURE: CPT

## 2024-07-12 PROCEDURE — 6360000002 HC RX W HCPCS: Performed by: OBSTETRICS & GYNECOLOGY

## 2024-07-12 PROCEDURE — 6370000000 HC RX 637 (ALT 250 FOR IP): Performed by: NURSE PRACTITIONER

## 2024-07-12 PROCEDURE — 1220000000 HC SEMI PRIVATE OB R&B

## 2024-07-12 PROCEDURE — 85027 COMPLETE CBC AUTOMATED: CPT

## 2024-07-12 RX ORDER — FERROUS SULFATE 325(65) MG
325 TABLET ORAL 2 TIMES DAILY WITH MEALS
Status: DISCONTINUED | OUTPATIENT
Start: 2024-07-12 | End: 2024-07-13 | Stop reason: HOSPADM

## 2024-07-12 RX ADMIN — ACETAMINOPHEN 1000 MG: 500 TABLET ORAL at 01:38

## 2024-07-12 RX ADMIN — KETOROLAC TROMETHAMINE 30 MG: 30 INJECTION, SOLUTION INTRAMUSCULAR at 01:38

## 2024-07-12 RX ADMIN — SODIUM CHLORIDE, PRESERVATIVE FREE 10 ML: 5 INJECTION INTRAVENOUS at 09:07

## 2024-07-12 RX ADMIN — SIMETHICONE 80 MG: 80 TABLET, CHEWABLE ORAL at 22:01

## 2024-07-12 RX ADMIN — KETOROLAC TROMETHAMINE 30 MG: 30 INJECTION, SOLUTION INTRAMUSCULAR at 16:03

## 2024-07-12 RX ADMIN — IBUPROFEN 800 MG: 400 TABLET, FILM COATED ORAL at 21:56

## 2024-07-12 RX ADMIN — POLYETHYLENE GLYCOL 3350 17 G: 17 POWDER, FOR SOLUTION ORAL at 09:05

## 2024-07-12 RX ADMIN — DOCUSATE SODIUM 100 MG: 100 CAPSULE, LIQUID FILLED ORAL at 09:05

## 2024-07-12 RX ADMIN — BENZOCAINE AND LEVOMENTHOL: 200; 5 SPRAY TOPICAL at 09:06

## 2024-07-12 RX ADMIN — FERROUS SULFATE TAB 325 MG (65 MG ELEMENTAL FE) 325 MG: 325 (65 FE) TAB at 21:56

## 2024-07-12 RX ADMIN — DOCUSATE SODIUM 100 MG: 100 CAPSULE, LIQUID FILLED ORAL at 21:56

## 2024-07-12 RX ADMIN — ACETAMINOPHEN 1000 MG: 500 TABLET ORAL at 18:53

## 2024-07-12 RX ADMIN — KETOROLAC TROMETHAMINE 30 MG: 30 INJECTION, SOLUTION INTRAMUSCULAR at 09:05

## 2024-07-12 RX ADMIN — ACETAMINOPHEN 1000 MG: 500 TABLET ORAL at 09:04

## 2024-07-12 RX ADMIN — OXYCODONE HYDROCHLORIDE 10 MG: 10 TABLET ORAL at 05:46

## 2024-07-12 RX ADMIN — PRENATAL VIT W/ FE FUMARATE-FA TAB 27-0.8 MG 1 TABLET: 27-0.8 TAB at 09:05

## 2024-07-12 ASSESSMENT — PAIN DESCRIPTION - DESCRIPTORS
DESCRIPTORS: CRAMPING
DESCRIPTORS: ACHING;CRAMPING
DESCRIPTORS: DISCOMFORT
DESCRIPTORS: SORE

## 2024-07-12 ASSESSMENT — PAIN SCALES - GENERAL
PAINLEVEL_OUTOF10: 1
PAINLEVEL_OUTOF10: 1
PAINLEVEL_OUTOF10: 2
PAINLEVEL_OUTOF10: 2
PAINLEVEL_OUTOF10: 5
PAINLEVEL_OUTOF10: 3

## 2024-07-12 ASSESSMENT — PAIN DESCRIPTION - LOCATION
LOCATION: INCISION
LOCATION: ABDOMEN
LOCATION: ABDOMEN;INCISION
LOCATION: ABDOMEN

## 2024-07-12 NOTE — OP NOTE
Operative Note      Patient: Matthew Rivas  YOB: 2000  MRN: [id number]    Date of Procedure:     * No surgery found *    Post-Op Diagnosis: {MH OR SAME:133988217}           * Surgery not found *    Assistant:   * Surgery not found *    Anesthesia: * No surgery found *    Estimated Blood Loss (mL): {NUMBERS; EBL:87686}    Complications: {Symptoms; Intra-op complications:42960}    Specimens:   * Cannot find log *    Implants:  * No surgical log found *      Drains:   Urinary Catheter 24 Gonzales (Active)       [REMOVED] Urinary Catheter 07/10/24 Gonzales (Removed)   Urine Color Yellow 07/10/24 1946   Urine Appearance Clear 07/10/24 1946   Collection Container Standard 07/10/24 1946   Status Draining 07/10/24 1946   Output (mL) 800 mL 24 1745       Findings:  Infection Present At Time Of Surgery (PATOS) (choose all levels that have infection present):  {PATOS LEVELS:213855023}  Other Findings: ***    Detailed Description of Procedure:     The patient was seen in the Holding Room. The risks, benefits, complications, treatment options, and expected outcomes were discussed with the patient.  The patient concurred with the proposed plan, giving informed consent.  The site of surgery properly noted/marked. The patient was taken to the Operating Room  identified as Matthew Rivas and the procedure verified as  Delivery. A Time Out was held and the above information confirmed.    After induction of anesthesia, the patient was draped and prepped in the usual sterile manner. A Pfannenstiel incision was made and carried down through the subcutaneous tissue to the fascia. Fascial incision was made and extended transversely. The fascia was  from the underlying rectus tissue superiorly and inferiorly. The peritoneum was identified and entered. Peritoneal incision was extended longitudinally. A low transverse uterine incision was made.  Delivered from *** presentation was a *** gram *** with

## 2024-07-12 NOTE — FLOWSHEET NOTE
Per YINKA Starkey CNM decision was made for a  Section at this time. Plan of care discussed with pt. Pt verbalized understanding.

## 2024-07-12 NOTE — FLOWSHEET NOTE
This RN spoke to YINKA Starkey CNM via phone at this time regarding a status update. Per YINKA Starkey CNM orders were given to start Pitocin at 2 mu.

## 2024-07-12 NOTE — FLOWSHEET NOTE
Per YINKA Starkey, CNM orders were given to increase Pitocin   Ear Star Wedge Flap Text: The defect edges were debeveled with a #15 blade scalpel.  Given the location of the defect and the proximity to free margins (helical rim) an ear star wedge flap was deemed most appropriate. Using a sterile surgical marker, the appropriate flap was drawn incorporating the defect and placing the expected incisions between the helical rim and antihelix where possible.  The area thus outlined was incised through and through with a #15 scalpel blade. Following this, the designed flap was carried over into the primary defect and sutured into place.

## 2024-07-12 NOTE — FLOWSHEET NOTE
Pt transferred to room 216 via stretcher accompanied by this RN and s/o. Room orientation complete. Questions and concerns addressed. POC discussed and pt verbalizes understanding. Call light within reach.

## 2024-07-12 NOTE — PROGRESS NOTES
[facility]  Progress Note    Subjective:     Postpartum Day 1:  Delivery    Patient is a  that delivered on The patient feels well. The patient denies emotional concerns. Pain is well controlled with current medications. The baby iswell. Baby is feeding via both breast and bottle - Similac with iron. Urinary output is adequate. The patient is ambulating well. The patient is tolerating a normal diet. Flatus denies been passed.    Objective:      Patient Vitals for the past 8 hrs:  Vitals:    24 1214   BP: 101/66   Pulse: 91   Resp: 16   Temp: 97.8 °F (36.6 °C)   SpO2: 97%       General:    alert, appears stated age, and cooperative   Bowel Sounds:  active   Lochia:  appropriate   Uterine Fundus:   firm   Incision:  no significant drainage, no dehiscence, no significant erythema, prevena in place   DVT Evaluation:  No evidence of DVT seen on physical exam.     Lab Results   Component Value Date    WBC 22.5 (H) 2024    HGB 8.8 (L) 2024    HCT 26.4 (L) 2024    MCV 89.8 2024     2024     Assessment:     Status post  section. Doing well postoperatively.     Plan:     Continue current care.  Over 50% of the total visit time of 25 minutes was spent on counseling and/or coordination of care of post partum course, importance of ambulation and incentive spirometry, pain control,  feeding, and wound care. All questions were answered and the patient voiced understanding.

## 2024-07-12 NOTE — LACTATION NOTE
Infant Name: Ruthy Meyer  Gestation: 39.3  Day of Life: 1  Birth weight: (3590g)  Today's weight: 7-13.9 lb  (3590g)  Weight loss: -0.56%  24 hour summary of feeds:  Voids:  Stools:  Assistive device:  Maternal History: , palpitations, achilles tendon surgery  Maternal Medications: zofran, ferrous sulfate, PNV  Maternal Goal: pump and formula feed  Breast pump for home: yes, Spectra electric pump    Encouraged mother to start pumping with her Spectra electric pump. Instructions for set up and cleaning given. Hand expression and breast compression encouraged to increase milk transfer while pumping. Instructed to pump for 15 mins, giving baby every drop of colostrum/EBM and then formula feed baby. Breastfeeding book given, formula feeding book given and pumping log given. [place], soap and syringe, spoon given. Instructions and handouts given over management of sore nipples, engorgement, plugged ducts, mastitis, hydration, nutrition, and medications that could effect milk supply. Mother knows when to call MD if needed. Lactation number and hours provided. Mother knows she can call and make appointment for pre and post feeding weights whenever needed or can call with questions or concerns her entire breastfeeding journey. All questions at this time answered. Support and Encouragement given.

## 2024-07-12 NOTE — H&P
[facility]    OB History and Physical    Provider: SHANTI AlbaradoM  Date: 7/10/24                Patient Name: Matthew Rivas  Patient : 2000  MRN: [id number]   Room/Bed: 0221/0221-01    SUBJECTIVE:    CHIEF COMPLAINT:  IOL elective  Chief Complaint   Patient presents with    Scheduled Induction       HISTORY OF PRESENT ILLNESS:      Matthew Jean a 24 y.o. female at 39w3d presents with a chief complaint as above and is being admitted for induction. Her pregnancy has been complicated by anemia and frequent UTIs    Contractions: 2-4  Rupture of membranes: intact  Vaginal bleeding: none    REVIEW OF SYSTEMS:  A comprehensive review of systems was negative except for what was noted in the HPI.     OBJECTIVE:     Estimated Due Date:   Estimated Date of Delivery: 7/15/24   Patient's last menstrual period was 10/09/2023 (exact date).      PREGNANCY RISK FACTORS:       Patient Active Problem List   Diagnosis    Menorrhagia    Irregular menses    Dysmenorrhea    Anemia during pregnancy in third trimester    38 weeks gestation of pregnancy    39 weeks gestation of pregnancy    Delivery of pregnancy by  section    Failure to progress in second stage of labor        Steroids:  no    PAST OB HISTORY:  OB History    Para Term  AB Living   1 0 0 0 0 0   SAB IAB Ectopic Molar Multiple Live Births   0 0 0 0 0 0      # Outcome Date GA Lbr Meño/2nd Weight Sex Delivery Anes PTL Lv   1 Current                  Depression:  No      Post-partum depression:  No      Diabetes:  No      Gestational Diabetes:  No      Thyroid Disease:  No      Chronic HTN:  No      Gestation HTN:  No      Pre-eclampsia:  No      Seizure disorder:  No      Asthma:  No      Clotting disorder:  No      :  No      Tubal ligation:  No      D & C:  No      Cerclage:  No      LEEP:  No      Myomectomy:  No    Past Medical History:        Diagnosis Date    Palpitations        Past Surgical

## 2024-07-12 NOTE — L&D DELIVERY NOTE
Surgical First Assistant: In order to participate and provide continuity of care for the patient during a  section.  Providing optimal surgical exposure, participated in tissue dissection, ensured hemostasis, performed or facilitated wound closure, and performed other intraoperative functions to assist the surgeon in carrying out a safe operation that optimizes outcomes.     Isra Rivas [[id number]]      Labor Events     Labor: No   Steroids: None  Cervical Ripening Date/Time:      Rupture Identifier: Sac 1  Rupture Date/Time:  24 01:55:00   Rupture Type: SROM, Intact  Fluid Color: Meconium  Fluid Odor: None              Anesthesia    Method: Epidural       Labor Event Times      Labor onset date/time:        Dilation complete date/time:  24 17:19:00 CDT     Start pushing date/time:  2024 16:45:00   Decision date/time (emergent ):            Delivery Details      Delivery Date: 24 Delivery Time: 19:26:00                 Cord                  Placenta           Lacerations           Blood Loss  Mother: Matthew Rivas #[id number]     Start of Mother's Information      Delivery Blood Loss  24 1858 - 24      None                 End of Mother's Information  Mother: Matthew Rivas #[id number]                Delivery Providers    Delivering clinician:      Provider Role     Obstetrician     Primary Nurse     Primary [place] Nurse     NICU Nurse     Neonatologist     Anesthesiologist     Nurse Anesthetist     Nurse Practitioner     Midwife     Nursery Nurse     Respiratory Therapist     Scrub Tech     Assistant Surgeon               Assessment    Living Status: Living        Skin Color:   Heart Rate:   Reflex Irritability:   Muscle Tone:   Respiratory Effort:   Total:            1 Minute:    1    2    2    2    2    9         5 Minute:    1    2    2    2    2    9                                        Apgars Assigned By: NUVIA SANTIAGO RN

## 2024-07-12 NOTE — ANESTHESIA POSTPROCEDURE EVALUATION
Department of Anesthesiology  Postprocedure Note    Patient: Matthew Rivas  MRN: [id number]  YOB: 2000  Date of evaluation: 7/11/2024    Procedure Summary       Date: 07/10/24 Room / Location:     Anesthesia Start: 1708 Anesthesia Stop:     Procedure: Labor Analgesia Diagnosis:     Scheduled Providers:  Responsible Provider: Estrellita Hand APRN - CRNA    Anesthesia Type: epidural ASA Status: 2            Anesthesia Type: No value filed.    Bari Phase I: Bari Score: 10    Bari Phase II:      Anesthesia Post Evaluation    Patient location during evaluation: PACU  Patient participation: complete - patient participated  Level of consciousness: awake and alert  Pain score: 0  Airway patency: patent  Nausea & Vomiting: no nausea and no vomiting  Cardiovascular status: hemodynamically stable  Respiratory status: acceptable, spontaneous ventilation and room air  Hydration status: euvolemic  Comments: Pt resting comfortably in Pacu. No complaints of back pain.  Epidural puncture side, CDI. No erythema, edema, or drainage noted. Catheter removed in OR by Niya mackay midwife. Tip intact and shown to pt  Multimodal analgesia pain management approach  Pain management: adequate    No notable events documented.

## 2024-07-13 VITALS
OXYGEN SATURATION: 97 % | RESPIRATION RATE: 16 BRPM | SYSTOLIC BLOOD PRESSURE: 104 MMHG | WEIGHT: 163 LBS | DIASTOLIC BLOOD PRESSURE: 71 MMHG | HEIGHT: 61 IN | BODY MASS INDEX: 30.78 KG/M2 | TEMPERATURE: 97.2 F | HEART RATE: 90 BPM

## 2024-07-13 PROBLEM — O99.013 ANEMIA DURING PREGNANCY IN THIRD TRIMESTER: Status: RESOLVED | Noted: 2024-03-06 | Resolved: 2024-07-13

## 2024-07-13 PROBLEM — Z3A.38 38 WEEKS GESTATION OF PREGNANCY: Status: RESOLVED | Noted: 2024-07-02 | Resolved: 2024-07-13

## 2024-07-13 PROBLEM — Z3A.39 39 WEEKS GESTATION OF PREGNANCY: Status: RESOLVED | Noted: 2024-07-09 | Resolved: 2024-07-13

## 2024-07-13 PROCEDURE — 6370000000 HC RX 637 (ALT 250 FOR IP): Performed by: OBSTETRICS & GYNECOLOGY

## 2024-07-13 PROCEDURE — 6370000000 HC RX 637 (ALT 250 FOR IP): Performed by: NURSE PRACTITIONER

## 2024-07-13 RX ORDER — IBUPROFEN 800 MG/1
800 TABLET ORAL EVERY 8 HOURS PRN
Qty: 60 TABLET | Refills: 0 | Status: SHIPPED | OUTPATIENT
Start: 2024-07-13

## 2024-07-13 RX ORDER — OXYCODONE HYDROCHLORIDE AND ACETAMINOPHEN 5; 325 MG/1; MG/1
1 TABLET ORAL EVERY 8 HOURS PRN
Qty: 20 TABLET | Refills: 0 | Status: SHIPPED | OUTPATIENT
Start: 2024-07-13 | End: 2024-07-20

## 2024-07-13 RX ADMIN — PRENATAL VIT W/ FE FUMARATE-FA TAB 27-0.8 MG 1 TABLET: 27-0.8 TAB at 08:39

## 2024-07-13 RX ADMIN — FERROUS SULFATE TAB 325 MG (65 MG ELEMENTAL FE) 325 MG: 325 (65 FE) TAB at 08:39

## 2024-07-13 RX ADMIN — IBUPROFEN 800 MG: 400 TABLET, FILM COATED ORAL at 06:59

## 2024-07-13 RX ADMIN — ACETAMINOPHEN 1000 MG: 500 TABLET ORAL at 08:40

## 2024-07-13 RX ADMIN — ACETAMINOPHEN 1000 MG: 500 TABLET ORAL at 00:48

## 2024-07-13 RX ADMIN — POLYETHYLENE GLYCOL 3350 17 G: 17 POWDER, FOR SOLUTION ORAL at 08:39

## 2024-07-13 RX ADMIN — DOCUSATE SODIUM 100 MG: 100 CAPSULE, LIQUID FILLED ORAL at 08:40

## 2024-07-13 ASSESSMENT — PAIN SCALES - GENERAL
PAINLEVEL_OUTOF10: 3
PAINLEVEL_OUTOF10: 2
PAINLEVEL_OUTOF10: 1

## 2024-07-13 ASSESSMENT — PAIN DESCRIPTION - LOCATION
LOCATION: ABDOMEN;INCISION
LOCATION: ABDOMEN
LOCATION: ABDOMEN

## 2024-07-13 ASSESSMENT — PAIN DESCRIPTION - ORIENTATION: ORIENTATION: LOWER

## 2024-07-13 ASSESSMENT — PAIN - FUNCTIONAL ASSESSMENT: PAIN_FUNCTIONAL_ASSESSMENT: ACTIVITIES ARE NOT PREVENTED

## 2024-07-13 ASSESSMENT — PAIN DESCRIPTION - DESCRIPTORS
DESCRIPTORS: ACHING;CRAMPING
DESCRIPTORS: SORE
DESCRIPTORS: ACHING;SORE

## 2024-07-13 NOTE — PROGRESS NOTES
S/p c/s day 2 for failure to progress  Passing gas, moving slowly, tolerating liquids  Vitals noted,   Labs noted low hgb  Exam unremarkable  A: stable   Plan , advance care, discharge instructions given

## 2024-07-14 NOTE — DISCHARGE SUMMARY
taking these medications      PRENATAL VITAMIN PO     Slow Fe 142 (45 Fe) MG extended release tablet  Generic drug: ferrous sulfate  Take 142 mg by mouth 2 times daily            STOP taking these medications      ondansetron 4 MG disintegrating tablet  Commonly known as: ZOFRAN-ODT               Where to Get Your Medications        These medications were sent to [id number] - [place], [place] - [address] - P [phone] - F [phone]  [address] [facility] [address]      Phone: [phone]   ibuprofen 800 MG tablet  oxyCODONE-acetaminophen 5-325 MG per tablet         No discharge procedures on file.    Activity: activity as tolerated  Diet: regular diet  Wound Care: keep wound clean and dry    Labs:   Admission on 07/09/2024, Discharged on 07/13/2024   Component Date Value Ref Range Status    Amphetamine Screen, Ur 07/09/2024 Negative  Negative <500 ng/mL Final    Barbiturate Screen, Ur 07/09/2024 Negative  Negative < 200 ng/mL Final    Benzodiazepine Screen, Urine 07/09/2024 Negative  Negative <150 ng/mL Final    Cannabinoid Scrn, Ur 07/09/2024 Negative  Negative <50 ng/mL Final    Cocaine Metabolite Screen, Urine 07/09/2024 Negative  Negative <150 ng/mL Final    Opiate Screen, Urine 07/09/2024 Negative  Negative < 100 ng/mL Final    Phencyclidine (PCP), Screen, Urine 07/09/2024 Negative  Negative <25 ng/mL Final    Methadone Screen, Urine 07/09/2024 Negative  Negative <200 ng/mL Final    Tricyclic Antidepressants, Urine 07/09/2024 Negative  Negative <300 ng/mL Final    Oxycodone Urine 07/09/2024 Negative  Negative <100 ng/mL Final    Buprenorphine Urine 07/09/2024 Negative  Negative <10 ng/mL Final    Methamphetamine, Urine 07/09/2024 Negative  Negative <500 ng/mL Final    FENTANYL SCREEN, URINE 07/09/2024 Negative  Negative <5 ng/mL Final    Drug Screen Comment: 07/09/2024 see below   Final    Comment: This method is an uncomfirmed screening test to detect only these drug  classes

## 2024-07-14 NOTE — PROGRESS NOTES
Post anesthesia labor epidural, epidural to  section follow up. Pt readying for discharge today. Moderate abdominal pain controlled with oral medication. Epidural puncture site CDI, no erythema, edema, or drainage, healing well. Denies back pain, headache, paresthesia, photophobia, tinnitus, or nausea.

## 2024-07-15 ENCOUNTER — LACTATION ENCOUNTER (OUTPATIENT)
Dept: LABOR AND DELIVERY | Age: 24
End: 2024-07-15

## 2024-07-15 NOTE — LACTATION NOTE
This note was copied from a baby's chart.  This is to inform you that baby has been seen since discharge    Day of Life: 4    : 24 @ 1927    GA: 39.3    Mom's blood type: B+    Birth weight: 7-13.9 lb (3590g)    Discharge weight: 7-9.7 lb (3450g)    Today's weight: 7-13.0 lb (3545g)    Weight loss: -1.25%    Bilizap: (draw serum if within 3 mg/dl of phototherapy on graph): 15.8    Today  Total neobili: 13.1    Infant feeding (type and how often in the last 24 hours): pumping every 3-6 hours obtaining 20-60 ml. Baby is eating 40-55 ml  EBM/formula every 2-2.5 hours    Stools (in the last 24 hours): 5    Voids (in the last 24 hours): 5    Color: sl. jaundice  Gums: moist  Skin: warm/dry  Cord: dry  Circumcision: n/a  Fontanels: soft/flat  Activity: alert/active      Spectra     Encouraged mother to continue pumping with hospital grade electric pump at bedside. Instructed to breastfeed or attempt if mother chooses every 2-3 hours for 15-20 mins each side or on demand. Hand expression and breast compression encouraged to increase milk transfer while breastfeeding and pumping. Instructed to pump for 15 mins after breastfeeding, giving baby every drop of colostrum/EBM and then formula feed, making sure baby is eating 2 oz every 2-3 hours.  Jaundice precautions discussed, mother knows to bring baby back for evaluation sooner than Monday if needed, if whites of baby's eyes become yellow, difficulty with waking baby for feedings and no stools. Instructed to place baby were baby can get indirect sunlight, to help eliminate jaundice. Reminded mother to increase feedings, the more baby eats the more baby poops. Mother verbalizes understanding.  Lactation number and hours provided. Mother knows she can call and make appointment for pre and post feeding weights whenever needed or can call with questions or concerns her entire breastfeeding journey. All questions at this time answered. Support and Encouragement given.

## 2024-07-16 ENCOUNTER — LACTATION ENCOUNTER (OUTPATIENT)
Dept: LABOR AND DELIVERY | Age: 24
End: 2024-07-16

## 2024-07-16 ENCOUNTER — TELEPHONE (OUTPATIENT)
Dept: OBGYN CLINIC | Age: 24
End: 2024-07-16

## 2024-07-16 NOTE — TELEPHONE ENCOUNTER
Called patient and scheduled appointment with niya kilgore being out 7/15-19. Told patient after this appointment she will be able to see Niya for her next visit.

## 2024-07-16 NOTE — LACTATION NOTE
This note was copied from a baby's chart.  Late entry from yesterday, forgot to close, copied pasted from incomplete note    This is to inform you that baby has been seen since discharge     Day of Life: 4     : 24 @ 1927     GA: 39.3     Mom's blood type: B+     Birth weight: 7-13.9 lb (3590g)     Discharge weight: 7-9.7 lb (3450g)     Today's weight: 7-13.0 lb (3545g)     Weight loss: -1.25%     Bilizap: (draw serum if within 3 mg/dl of phototherapy on graph): 15.8     Today  Total neobili: 13.1     Infant feeding (type and how often in the last 24 hours): pumping every 3-6 hours obtaining 20-60 ml. Baby is eating 40-55 ml  EBM/formula every 2-2.5 hours     Stools (in the last 24 hours): 5     Voids (in the last 24 hours): 5     Color: sl. jaundice  Gums: moist  Skin: warm/dry  Cord: dry  Circumcision: n/a  Fontanels: soft/flat  Activity: alert/active       Spectra      Encouraged mother to continue pumping with hospital grade electric pump at bedside. Instructed to breastfeed or attempt if mother chooses every 2-3 hours for 15-20 mins each side or on demand. Hand expression and breast compression encouraged to increase milk transfer while breastfeeding and pumping. Instructed to pump for 15 mins after breastfeeding, giving baby every drop of colostrum/EBM and then formula feed, making sure baby is eating 2 oz every 2-3 hours.  Jaundice precautions discussed, mother knows to bring baby back for evaluation sooner than Monday if needed, if whites of baby's eyes become yellow, difficulty with waking baby for feedings and no stools. Instructed to place baby were baby can get indirect sunlight, to help eliminate jaundice. Reminded mother to increase feedings, the more baby eats the more baby poops. Mother verbalizes understanding.  Lactation number and hours provided. Mother knows she can call and make appointment for pre and post feeding weights whenever needed or can call with questions or concerns her

## 2024-07-17 ENCOUNTER — LACTATION ENCOUNTER (OUTPATIENT)
Dept: LABOR AND DELIVERY | Age: 24
End: 2024-07-17

## 2024-07-17 NOTE — LACTATION NOTE
This note was copied from a baby's chart.  Late entry from yesterday, forgot to close, copied pasted from incomplete note    This is to inform you that baby has been seen since discharge     Day of Life: 6     : 24 @ 1927     GA: 39.3     Mom's blood type: B+     Birth weight: 7-13.9 lb (3590g)     Discharge weight: 7-9.7 lb (3450g)     24: 7-13.0 lb (3545g)    Today's weight: 8-1.5 lb (3665g)     Weight gain: 2%     Bilizap: (draw serum if within 3 mg/dl of phototherapy on graph):      24                            Today  Total neobili: 13.1          Total neobili: 10.7     Infant feeding (type and how often in the last 24 hours): pumping every 3  hours obtaining 1.5-2 oz . Baby is eating 2-4 oz EBM/formula every 2-2.5 hours     Stools (in the last 24 hours): 6+     Voids (in the last 24 hours): 6+     Color: sl. jaundice  Gums: moist  Skin: warm/dry  Cord: dry  Circumcision: n/a  Fontanels: soft/flat  Activity: alert/active       Spectra      Encouraged mother to continue pumping with hospital grade electric pump at bedside. Instructed to breastfeed or attempt if mother chooses every 2-3 hours for 15-20 mins each side or on demand. Hand expression and breast compression encouraged to increase milk transfer while breastfeeding and pumping. Instructed to pump for 15 mins after breastfeeding, giving baby every drop of colostrum/EBM and then formula feed, making sure baby is eating 2 oz every 2-3 hours.  Jaundice precautions discussed, mother knows to bring baby back for evaluation sooner than Monday if needed, if whites of baby's eyes become yellow, difficulty with waking baby for feedings and no stools. Instructed to place baby were baby can get indirect sunlight, to help eliminate jaundice. Reminded mother to increase feedings, the more baby eats the more baby poops. Mother verbalizes understanding.  Lactation number and hours provided. Mother knows she can call and make appointment for pre

## 2024-07-18 ENCOUNTER — POSTPARTUM VISIT (OUTPATIENT)
Dept: OBGYN CLINIC | Age: 24
End: 2024-07-18

## 2024-07-18 VITALS
DIASTOLIC BLOOD PRESSURE: 74 MMHG | SYSTOLIC BLOOD PRESSURE: 106 MMHG | HEIGHT: 61 IN | WEIGHT: 149.9 LBS | HEART RATE: 109 BPM | BODY MASS INDEX: 28.3 KG/M2

## 2024-07-18 DIAGNOSIS — Z09 POSTOPERATIVE EXAMINATION: ICD-10-CM

## 2024-07-18 PROCEDURE — 99024 POSTOP FOLLOW-UP VISIT: CPT | Performed by: OBSTETRICS & GYNECOLOGY

## 2024-07-18 ASSESSMENT — ENCOUNTER SYMPTOMS
RESPIRATORY NEGATIVE: 1
ABDOMINAL PAIN: 1

## 2024-07-18 NOTE — PROGRESS NOTES
SUBJECTIVE:  Matthew Rivas is a 24 y.o.  who is S/P LTCS on  and presents for  post-op/postpartum exam and is without complaints.      Review of Systems   Constitutional: Negative.    Respiratory: Negative.     Cardiovascular: Negative.    Gastrointestinal:  Positive for abdominal pain.   Genitourinary: Negative.    Musculoskeletal: Negative.    Neurological: Negative.    Psychiatric/Behavioral: Negative.     All other systems reviewed and are negative.      GYN HX:   Patient's last menstrual period was 10/09/2023 (exact date).  Abnormal Bleeding/Menses: none  Abnormal pap smear:none    Social History     Substance and Sexual Activity   Sexual Activity Yes    Partners: Male    Birth control/protection: Condom     OB History          1    Para   1    Term   1       0    AB   0    Living   1         SAB   0    IAB   0    Ectopic   0    Molar   0    Multiple   0    Live Births   1                Because violence is so common, we ask all our patients: are you in a relationship or do you live with a person who threatens, hurts, orcontrols you: No    Past Medical History:   Diagnosis Date    Palpitations      Past Surgical History:   Procedure Laterality Date    ACHILLES TENDON SURGERY       SECTION N/A 2024     SECTION performed by Meg Pompa MD at [facility] L[facility]D [facility]     Family History   Problem Relation Age of Onset    Lung Cancer Maternal Grandfather     Bone Cancer Maternal Grandfather      Social History     Tobacco Use    Smoking status: Never    Smokeless tobacco: Never   Vaping Use    Vaping Use: Never used   Substance Use Topics    Alcohol use: No    Drug use: No     Current Outpatient Medications on File Prior to Visit   Medication Sig Dispense Refill    docusate (COLACE) 50 MG/5ML liquid Take 5 mLs by mouth daily      ibuprofen (ADVIL;MOTRIN) 800 MG tablet Take 1 tablet by mouth every 8 hours as needed for Pain 60 tablet 0    ferrous sulfate (SLOW FE) 142

## 2024-07-18 NOTE — PROGRESS NOTES
The patient returns for her post-partum visit.  All information below was reviewed with her.    Visit Reason:  Post-Partum Visit       Baby's Name: Ruthy       Delivery Date: 24        Type of Delivery:         Feeding: Pumping & Formula        LMP: 10/9/2023       Contraceptive Choices: N/A       Last PAP: 2023       Depression: 1

## 2024-08-19 SDOH — ECONOMIC STABILITY: INCOME INSECURITY: HOW HARD IS IT FOR YOU TO PAY FOR THE VERY BASICS LIKE FOOD, HOUSING, MEDICAL CARE, AND HEATING?: NOT HARD AT ALL

## 2024-08-19 SDOH — ECONOMIC STABILITY: FOOD INSECURITY: WITHIN THE PAST 12 MONTHS, THE FOOD YOU BOUGHT JUST DIDN'T LAST AND YOU DIDN'T HAVE MONEY TO GET MORE.: NEVER TRUE

## 2024-08-19 SDOH — ECONOMIC STABILITY: FOOD INSECURITY: WITHIN THE PAST 12 MONTHS, YOU WORRIED THAT YOUR FOOD WOULD RUN OUT BEFORE YOU GOT MONEY TO BUY MORE.: NEVER TRUE

## 2024-08-22 ENCOUNTER — POSTPARTUM VISIT (OUTPATIENT)
Dept: OBGYN CLINIC | Age: 24
End: 2024-08-22

## 2024-08-22 VITALS
DIASTOLIC BLOOD PRESSURE: 63 MMHG | WEIGHT: 141 LBS | SYSTOLIC BLOOD PRESSURE: 96 MMHG | BODY MASS INDEX: 26.64 KG/M2 | HEART RATE: 65 BPM

## 2024-08-22 DIAGNOSIS — Z11.1 PPD SCREENING TEST: Primary | ICD-10-CM

## 2024-08-22 PROCEDURE — S3005 EVAL SELF-ASSESS DEPRESSION: HCPCS | Performed by: NURSE PRACTITIONER

## 2024-08-22 PROCEDURE — 0503F POSTPARTUM CARE VISIT: CPT | Performed by: NURSE PRACTITIONER

## 2024-08-22 ASSESSMENT — ENCOUNTER SYMPTOMS
GASTROINTESTINAL NEGATIVE: 1
ALLERGIC/IMMUNOLOGIC NEGATIVE: 1
EYES NEGATIVE: 1
RESPIRATORY NEGATIVE: 1

## 2024-08-22 NOTE — PROGRESS NOTES
The patient returns for her post-partum visit.  All information below was reviewed with her.    Visit Reason:  Post-Partum Visit       Baby's Name:  Ruthy       Delivery Date:  7/11/2024       Type of Delivery:  c section        Feeding: formula        LMP:  unknown        Contraceptive Choices: declines        Last PAP:  8/2023       Depression:     Problems:  She c/o about back pain from her spinal, the pain just started this week, feels like a bruise.     
postpartum follow-up - Matthew present to the office for her 6 week ppv following her c section. Pt states her mood is good. Pt is formula feeding. Pt states her bleeding stopped 2 weeks ago. Pt declines birth control at this time.        3. Encounter for routine postpartum follow-up           I JESSICA Flores, am scribing for and in the presence of SINGH High.  8/22/24  10:55 AM CDT     I have seen and examined the patient independently.  I reviewed all laboratory and imaging studies that are relevant.  I have reviewed and made any appropriate changes to the HPI.    Electronically signed by SHANTI Albarado CNM on 8/22/24  at 11:36 AM CDT

## 2024-08-22 NOTE — PATIENT INSTRUCTIONS
Patient Education        Vaginal Childbirth: Care Instructions  Overview     Vaginal birth means delivering a baby through the birth canal (vagina). During labor, the uterus tightens (contracts) regularly to thin and open the cervix and to push the baby out through the birth canal.  Your body will slowly heal in the next few weeks. It's easy to get too tired and overwhelmed during the first weeks after your baby is born. Changes in your hormones can shift your mood without warning. You may find it hard to meet the extra demands on your energy and time. Take it easy on yourself.  Follow-up care is a key part of your treatment and safety. Be sure to make and go to all appointments, and call your doctor if you are having problems. It's also a good idea to know your test results and keep a list of the medicines you take.  How can you care for yourself at home?  Vaginal bleeding and cramps  After delivery, you will have a bloody discharge from your vagina. This will turn pink within a week and then white or yellow after about 10 days. It may last for 2 to 4 weeks or longer, until the uterus has healed. Use sanitary pads until you stop bleeding. Using pads makes it easier to monitor your bleeding.  Don't worry if you pass some blood clots, as long as they are smaller than a golf ball. If you have a tear or stitches in your vaginal area, change the pad at least every 4 hours. This will help prevent soreness and infection.  You may have cramps for the first few days after childbirth. These are normal and occur as the uterus shrinks to normal size. Take an over-the-counter pain medicine, such as acetaminophen (Tylenol), ibuprofen (Advil, Motrin), or naproxen (Aleve), for cramps. Read and follow all instructions on the label. Do not take aspirin, because it can cause more bleeding.  Do not take two or more pain medicines at the same time unless the doctor told you to. Many pain medicines have acetaminophen, which is Tylenol.

## 2024-11-22 ENCOUNTER — OFFICE VISIT (OUTPATIENT)
Dept: OBGYN CLINIC | Age: 24
End: 2024-11-22

## 2024-11-22 VITALS
BODY MASS INDEX: 28.72 KG/M2 | WEIGHT: 152 LBS | DIASTOLIC BLOOD PRESSURE: 77 MMHG | HEART RATE: 99 BPM | SYSTOLIC BLOOD PRESSURE: 111 MMHG

## 2024-11-22 DIAGNOSIS — Z12.39 ENCOUNTER FOR SCREENING BREAST EXAMINATION: ICD-10-CM

## 2024-11-22 DIAGNOSIS — Z12.4 SCREENING FOR CERVICAL CANCER: ICD-10-CM

## 2024-11-22 DIAGNOSIS — Z01.419 WELL WOMAN EXAM WITH ROUTINE GYNECOLOGICAL EXAM: Primary | ICD-10-CM

## 2024-11-22 NOTE — PROGRESS NOTES
Pt presents today for pap smear and breast exam.      Last mammogram:  never  Last pap smear:  2023  Contraception, tubal, vasotomy or condoms:  none  :  1  Para:  1  AB:  0  Last bone density:  never  Last colonoscopy:   never  Sexual Active: yes

## 2024-11-22 NOTE — PROGRESS NOTES
[facility]  CNM Office Note    Matthew Rivas is a 24 y.o. female who presents today for her medical conditions/ complaints as noted below.  Chief Complaint   Patient presents with    Annual Exam     Matthew presents to the office for her yearly exam, pt is doing well. Pt is currently having a monthly cycle. Pt does not use any form of birth control. Pt denies breast tenderness, she has stopped breastfeeding.       Patient Active Problem List   Diagnosis    Delivery of pregnancy by  section       Patient's last menstrual period was 2024.      Past Medical History:   Diagnosis Date    Palpitations      Past Surgical History:   Procedure Laterality Date    ACHILLES TENDON SURGERY       SECTION N/A 2024     SECTION performed by Meg Pompa MD at [facility] L&D OR     Family History   Problem Relation Age of Onset    Lung Cancer Maternal Grandfather     Bone Cancer Maternal Grandfather      Social History     Tobacco Use    Smoking status: Never    Smokeless tobacco: Never   Substance Use Topics    Alcohol use: No       Current Outpatient Medications   Medication Sig Dispense Refill    Prenatal Vit-Fe Fumarate-FA (PRENATAL VITAMIN PO) Take by mouth (Patient not taking: Reported on 2024)       No current facility-administered medications for this visit.     Allergies   Allergen Reactions    Codeine      Panic attack      Vitals:    24 0849   BP: 111/77   Pulse: 99     Body mass index is 28.72 kg/m².    Review of Systems   Constitutional: Negative.    HENT: Negative.     Eyes: Negative.    Respiratory: Negative.     Cardiovascular: Negative.    Gastrointestinal: Negative.    Endocrine: Negative.    Genitourinary: Negative.  Negative for dyspareunia, menstrual problem, pelvic pain, vaginal bleeding, vaginal discharge and vaginal pain.   Musculoskeletal: Negative.    Skin: Negative.    Allergic/Immunologic: Negative.    Neurological: Negative.

## 2025-04-08 ENCOUNTER — TELEPHONE (OUTPATIENT)
Dept: OBGYN CLINIC | Age: 25
End: 2025-04-08

## 2025-04-18 SDOH — ECONOMIC STABILITY: INCOME INSECURITY: IN THE LAST 12 MONTHS, WAS THERE A TIME WHEN YOU WERE NOT ABLE TO PAY THE MORTGAGE OR RENT ON TIME?: NO

## 2025-04-18 SDOH — ECONOMIC STABILITY: FOOD INSECURITY: WITHIN THE PAST 12 MONTHS, YOU WORRIED THAT YOUR FOOD WOULD RUN OUT BEFORE YOU GOT MONEY TO BUY MORE.: NEVER TRUE

## 2025-04-18 SDOH — ECONOMIC STABILITY: FOOD INSECURITY: WITHIN THE PAST 12 MONTHS, THE FOOD YOU BOUGHT JUST DIDN'T LAST AND YOU DIDN'T HAVE MONEY TO GET MORE.: NEVER TRUE

## 2025-04-18 SDOH — ECONOMIC STABILITY: TRANSPORTATION INSECURITY
IN THE PAST 12 MONTHS, HAS THE LACK OF TRANSPORTATION KEPT YOU FROM MEDICAL APPOINTMENTS OR FROM GETTING MEDICATIONS?: NO

## 2025-04-18 ASSESSMENT — PATIENT HEALTH QUESTIONNAIRE - PHQ9
SUM OF ALL RESPONSES TO PHQ QUESTIONS 1-9: 0
2. FEELING DOWN, DEPRESSED OR HOPELESS: NOT AT ALL
1. LITTLE INTEREST OR PLEASURE IN DOING THINGS: NOT AT ALL
2. FEELING DOWN, DEPRESSED OR HOPELESS: NOT AT ALL
SUM OF ALL RESPONSES TO PHQ QUESTIONS 1-9: 0
SUM OF ALL RESPONSES TO PHQ QUESTIONS 1-9: 0
SUM OF ALL RESPONSES TO PHQ9 QUESTIONS 1 & 2: 0
1. LITTLE INTEREST OR PLEASURE IN DOING THINGS: NOT AT ALL
SUM OF ALL RESPONSES TO PHQ QUESTIONS 1-9: 0

## 2025-04-21 ENCOUNTER — OFFICE VISIT (OUTPATIENT)
Dept: OBGYN CLINIC | Age: 25
End: 2025-04-21
Payer: COMMERCIAL

## 2025-04-21 VITALS
HEIGHT: 61 IN | BODY MASS INDEX: 30.78 KG/M2 | WEIGHT: 163 LBS | DIASTOLIC BLOOD PRESSURE: 70 MMHG | HEART RATE: 79 BPM | SYSTOLIC BLOOD PRESSURE: 109 MMHG

## 2025-04-21 DIAGNOSIS — Z36.89 CONFIRM FETAL CARDIAC ACTIVITY USING ULTRASOUND: ICD-10-CM

## 2025-04-21 DIAGNOSIS — Z32.00 POSSIBLE PREGNANCY: ICD-10-CM

## 2025-04-21 DIAGNOSIS — N91.2 AMENORRHEA: Primary | ICD-10-CM

## 2025-04-21 DIAGNOSIS — Z98.891 HISTORY OF CESAREAN DELIVERY: ICD-10-CM

## 2025-04-21 LAB
CONTROL: ABNORMAL
PREGNANCY TEST URINE, POC: ABNORMAL

## 2025-04-21 PROCEDURE — 99213 OFFICE O/P EST LOW 20 MIN: CPT | Performed by: NURSE PRACTITIONER

## 2025-04-21 PROCEDURE — 81025 URINE PREGNANCY TEST: CPT | Performed by: NURSE PRACTITIONER

## 2025-04-21 ASSESSMENT — ENCOUNTER SYMPTOMS
EYES NEGATIVE: 1
ALLERGIC/IMMUNOLOGIC NEGATIVE: 1
RESPIRATORY NEGATIVE: 1
GASTROINTESTINAL NEGATIVE: 1

## 2025-04-21 NOTE — PROGRESS NOTES
screen/Collins/AFP/ Panorama, and other genetic testing.  The patient was encouraged to attend childbirth classes and general hospital information was provided based on patient’s hospital of choice.  [facility] 3 weeks   There are no Patient Instructions on file for this visit.

## 2025-04-22 ENCOUNTER — HOSPITAL ENCOUNTER (OUTPATIENT)
Dept: ULTRASOUND IMAGING | Age: 25
Discharge: HOME | End: 2025-04-22
Payer: COMMERCIAL

## 2025-04-22 DIAGNOSIS — Z36.89 CONFIRM FETAL CARDIAC ACTIVITY USING ULTRASOUND: ICD-10-CM

## 2025-04-22 PROCEDURE — 76817 TRANSVAGINAL US OBSTETRIC: CPT

## 2025-04-28 ENCOUNTER — RESULTS FOLLOW-UP (OUTPATIENT)
Dept: OBGYN CLINIC | Age: 25
End: 2025-04-28

## 2025-04-28 NOTE — TELEPHONE ENCOUNTER
----- Message from SHANTI Ledbetter sent at 4/28/2025  3:57 PM CDT -----  Let pt know her ultrasound was done early so heart rate not seen. Can you set her up here soon for repeat? Or if she prefers can go to [facility].

## 2025-04-29 DIAGNOSIS — Z36.89 CONFIRM FETAL CARDIAC ACTIVITY USING ULTRASOUND: Primary | ICD-10-CM

## 2025-04-29 NOTE — TELEPHONE ENCOUNTER
----- Message from SHANTI Ledbetter sent at 4/28/2025  3:57 PM CDT -----  Let pt know her ultrasound was done early so heart rate not seen. Can you set her up here soon for repeat? Or if she prefers can go to [facility].

## 2025-04-29 NOTE — TELEPHONE ENCOUNTER
Called and spoke with pt at this time regarding US results. Pt voiced understanding at this time. Pt scheduled for US at this time.

## 2025-04-29 NOTE — TELEPHONE ENCOUNTER
----- Message from SHANTI Ledbetter sent at 4/28/2025  3:57 PM CDT -----  Let pt know her ultrasound was done early so heart rate not seen. Can you set her up here soon for repeat? Or if she prefers can go to [facility].

## 2025-05-02 ENCOUNTER — RESULTS FOLLOW-UP (OUTPATIENT)
Dept: OBGYN CLINIC | Age: 25
End: 2025-05-02

## 2025-05-02 ENCOUNTER — HOSPITAL ENCOUNTER (OUTPATIENT)
Dept: ULTRASOUND IMAGING | Age: 25
Discharge: HOME | End: 2025-05-02
Payer: COMMERCIAL

## 2025-05-02 DIAGNOSIS — Z36.89 CONFIRM FETAL CARDIAC ACTIVITY USING ULTRASOUND: ICD-10-CM

## 2025-05-02 PROCEDURE — 76817 TRANSVAGINAL US OBSTETRIC: CPT

## 2025-05-12 ENCOUNTER — INITIAL PRENATAL (OUTPATIENT)
Dept: OBGYN CLINIC | Age: 25
End: 2025-05-12

## 2025-05-12 VITALS
SYSTOLIC BLOOD PRESSURE: 124 MMHG | WEIGHT: 162 LBS | DIASTOLIC BLOOD PRESSURE: 80 MMHG | HEART RATE: 108 BPM | BODY MASS INDEX: 30.61 KG/M2

## 2025-05-12 DIAGNOSIS — Z13.79 GENETIC SCREENING: ICD-10-CM

## 2025-05-12 DIAGNOSIS — Z36.9 ANTENATAL SCREENING ENCOUNTER: ICD-10-CM

## 2025-05-12 DIAGNOSIS — Z34.81 ENCOUNTER FOR SUPERVISION OF OTHER NORMAL PREGNANCY IN FIRST TRIMESTER: Primary | ICD-10-CM

## 2025-05-12 DIAGNOSIS — Z3A.09 9 WEEKS GESTATION OF PREGNANCY: ICD-10-CM

## 2025-05-12 DIAGNOSIS — Z98.891 HISTORY OF CESAREAN DELIVERY: ICD-10-CM

## 2025-05-12 NOTE — PROGRESS NOTES
CNM Prenatal Office Note  Subjective:  Matthew Rivas is here for initial obstetrical visit. She has had confirmation of pregnancy. Her history has been reviewed and OB complications identified. Today she is 9w4d weeks EGA. She is doing well and has no complaints.  She  does not have n/v/d/c, vaginal bleeding, or other common first trimester complaints.   Objective:  Mother's Prenatal Vitals  BP: 124/80  Weight - Scale: 73.5 kg (162 lb)  Pulse: (!) 108  Patient Position: Sitting  Prenatal Fetal Information  Fetal HR: 172us  Movement: Absent  Pt is A&Ox3, in no acute distress. Normocephalic, atraumatic. PERRL. Resp even and non-labored. Skin pink, warm & dry. Gravid abdomen. WONG's well. Gait steady.   Assessment:    IUP at 9w4d wks      Diagnosis Orders   1. Encounter for supervision of other normal pregnancy in first trimester        2. 9 weeks gestation of pregnancy        3.  screening encounter  HIV Obstetric Panel    Varicella Zoster Antibody, IgG    Culture, Urine    Herpes Simplex Virus (HSV) I Glycoprotein Antibody IgG    Hepatitis C Antibody    Chlamydia/N. Gonorrhoeae/T.Vaginalis, Urine      4. Genetic screening  Panorama Prenatal Test      5. History of  delivery          Plan:  Pt counseled on balanced nutrition, adequate fluid intake, taking PNV daily, and exercise. Continue with routine prenatal care.  RTC in 4 wks for prenatal visit, u/s, physical exam, and results  Ob us from 25 discussed  MEDICATIONS:  No orders of the defined types were placed in this encounter.    ORDERS:  Orders Placed This Encounter   Procedures    Culture, Urine    Chlamydia/N. Gonorrhoeae/T.Vaginalis, Urine    HIV Obstetric Panel    Varicella Zoster Antibody, IgG    Herpes Simplex Virus (HSV) I Glycoprotein Antibody IgG    Hepatitis C Antibody    Panorama Prenatal Test     Congratulations Matthew Rivas on your positive pregnancy test! Today we will check your pregnancy hormone levels and plan for

## 2025-05-12 NOTE — PATIENT INSTRUCTIONS
Patient Education        Weeks 6 to 10 of Your Pregnancy: Care Instructions  During these weeks of pregnancy, your body goes through many changes. You may start to feel different, both in your body and your emotions. Each pregnancy is different, so there's no \"right\" way to feel. These early weeks are a time to make healthy choices for you and your pregnancy.    Take a daily prenatal vitamin. Choose one with folic acid in it.   Avoid alcohol, tobacco, and drugs (including marijuana). If you need help quitting, talk to your doctor.     Drink plenty of liquids.  Be sure to drink enough water. And limit sodas, other sweetened drinks, and caffeine.     Choose foods that are good sources of calcium, iron, and folate.  You can try dairy products, dark leafy greens, fortified orange juice and cereals, almonds, broccoli, dried fruit, and beans.     Avoid foods that may be harmful.  Don't eat raw meat, deli meat, raw seafood, or raw eggs. Avoid soft cheese and unpasteurized dairy, like Brie and blue cheese. And don't eat fish that contains a lot of mercury, like shark and swordfish.     Don't touch prakash litter or cat poop.  They can cause an infection that could be harmful during pregnancy.     Avoid things that can make your body too hot.  For example, avoid hot tubs and saunas.     Soothe morning sickness.  Try eating 5 or 6 small meals a day, getting some fresh air, or using hernandez to control symptoms.     Ask your doctor about flu and COVID-19 shots.  Getting them can help protect against infection.   Follow-up care is a key part of your treatment and safety. Be sure to make and go to all appointments, and call your doctor if you are having problems. It's also a good idea to know your test results and keep a list of the medicines you take.  Where can you learn more?  Go to https://www.healthwise.net/patientEd and enter G112 to learn more about \"Weeks 6 to 10 of Your Pregnancy: Care Instructions.\"  Current as of: April

## 2025-05-16 DIAGNOSIS — Z36.9 ANTENATAL SCREENING ENCOUNTER: ICD-10-CM

## 2025-05-18 LAB
BACTERIA UR CULT: NORMAL
HSV1 GG IGG SER-ACNC: 0.68 IV
HSV2 GG IGG SER-ACNC: 0.03 IV
VZV IGG SER IA-ACNC: 0.04 S/CO

## 2025-05-19 LAB
BASOPHILS # BLD: 0 K/UL (ref 0–0.2)
BASOPHILS NFR BLD: 0.4 % (ref 0–1)
EOSINOPHIL # BLD: 0.1 K/UL (ref 0–0.6)
EOSINOPHIL NFR BLD: 0.6 % (ref 0–5)
ERYTHROCYTE [DISTWIDTH] IN BLOOD BY AUTOMATED COUNT: 14.1 % (ref 11.5–14.5)
HBV SURFACE AG SERPL QL IA: ABNORMAL
HCT VFR BLD AUTO: 37.5 % (ref 37–47)
HGB BLD-MCNC: 12.5 G/DL (ref 12–16)
HIV-1 P24 AG: ABNORMAL
HIV1+2 AB SERPLBLD QL IA.RAPID: ABNORMAL
IMM GRANULOCYTES # BLD: 0.1 K/UL
LYMPHOCYTES # BLD: 2 K/UL (ref 1.1–4.5)
LYMPHOCYTES NFR BLD: 20 % (ref 20–40)
MCH RBC QN AUTO: 27.4 PG (ref 27–31)
MCHC RBC AUTO-ENTMCNC: 33.3 G/DL (ref 33–37)
MCV RBC AUTO: 82.1 FL (ref 81–99)
MONOCYTES # BLD: 0.4 K/UL (ref 0–0.9)
MONOCYTES NFR BLD: 3.6 % (ref 0–10)
NEUTROPHILS # BLD: 7.4 K/UL (ref 1.5–7.5)
NEUTS SEG NFR BLD: 74.9 % (ref 50–65)
PLATELET # BLD AUTO: 253 K/UL (ref 130–400)
PMV BLD AUTO: 10.2 FL (ref 9.4–12.3)
RBC # BLD AUTO: 4.57 M/UL (ref 4.2–5.4)
RPR SER QL: ABNORMAL
RUBV IGG SER-ACNC: 252 [IU]/ML
WBC # BLD AUTO: 9.9 K/UL (ref 4.8–10.8)

## 2025-06-11 ENCOUNTER — ROUTINE PRENATAL (OUTPATIENT)
Dept: OBGYN CLINIC | Age: 25
End: 2025-06-11

## 2025-06-11 VITALS
DIASTOLIC BLOOD PRESSURE: 84 MMHG | BODY MASS INDEX: 29.85 KG/M2 | HEART RATE: 110 BPM | WEIGHT: 158 LBS | SYSTOLIC BLOOD PRESSURE: 132 MMHG

## 2025-06-11 DIAGNOSIS — Z3A.13 13 WEEKS GESTATION OF PREGNANCY: ICD-10-CM

## 2025-06-11 DIAGNOSIS — Z34.82 ENCOUNTER FOR SUPERVISION OF OTHER NORMAL PREGNANCY IN SECOND TRIMESTER: Primary | ICD-10-CM

## 2025-06-11 DIAGNOSIS — Z98.891 HISTORY OF CESAREAN DELIVERY: ICD-10-CM

## 2025-06-11 PROCEDURE — 0502F SUBSEQUENT PRENATAL CARE: CPT | Performed by: NURSE PRACTITIONER

## 2025-06-11 NOTE — PROGRESS NOTES
[facility] Prenatal Office Note  Subjective:  Matthew Rivas is here for a return obstetrical visit. Today she is 13w6d weeks EGA. She is doing well, taking her PNV as directed, and has no complaints.  She  does not have vaginal bleeding, n/v, syncope, or headaches.  Pt does not feel fetal movement regularly.    Objective:  Mother's Prenatal Vitals  BP: 132/84  Weight - Scale: 71.7 kg (158 lb)  Pulse: (!) 110  Patient Position: Sitting  Prenatal Fetal Information  Fetal HR: 146  Movement: Absent  Pt is A&Ox3, in no acute distress. Normocephalic, atraumatic. PERRL. Resp even and non-labored. Skin pink, warm & dry. Gravid abdomen. WONG's well. Gait steady.   Assessment:    IUP at 13w6d wks      Diagnosis Orders   1. Encounter for supervision of other normal pregnancy in second trimester        2. 13 weeks gestation of pregnancy        3. History of  delivery          Plan:  Pt counseled on balanced nutrition, adequate fluid intake, taking PNV daily, and exercise along with   and Genetic testing  Continue with routine prenatal care.  Return in about 4 weeks (around 2025) for prenatal.       MEDICATIONS:  No orders of the defined types were placed in this encounter.    ORDERS:  No orders of the defined types were placed in this encounter.      More than 50% of this 20 min visit was education and counseling.  JESSICA Steinberg, am scribing for and in the presence of SINGH High.  25  10:03 AM CDT     I have seen and examined the patient independently.  I reviewed all laboratory and imaging studies that are relevant.  I have reviewed and made any appropriate changes to the HPI.    Electronically signed by SHANTI Albarado CNM on 25  at 10:00 PM CDT   
Pt is here for routine parental care. Pt denies vaginal bleeding, cramping or leaking of fluid.   
No

## 2025-06-11 NOTE — PATIENT INSTRUCTIONS
Patient Education        Weeks 10 to 14 of Your Pregnancy: Care Instructions  It's now possible to hear the fetus's heartbeat with a special ultrasound device. And the fetus's organs are developing.     It's okay to exercise. Try activities such as walking or swimming. Check with your doctor before starting a new program.     10 things to know for weeks 10 to 14 of pregnancy   You may feel more tired than usual.  Taking naps during the day may help.    You may feel emotional.  It might help to talk to someone.    You may have headaches.  Try lying down and putting a cool cloth over your forehead.    You can use acetaminophen (Tylenol) for pain relief.  Don't take any anti-inflammatory medicines (such as Advil, Motrin, Aleve), unless your doctor says it's okay.    You may feel a fullness or aching in your lower belly.  This can feel like the kind of cramps you might get before a period. A back rub may help.    You may need to urinate more.  Your growing uterus and changing hormones can affect your bladder.    You may feel sick to your stomach (morning sickness).  Try avoiding food and smells that make you feel sick.    Your breasts may feel different.  They may feel tender or get bigger. Your nipples may get darker. Try a bra that gives you good support.    Avoid alcohol, tobacco, and drugs (including marijuana).  If you need help quitting, talk to your doctor.    Take a daily prenatal vitamin.  Choose one with folic acid.   Follow-up care is a key part of your treatment and safety. Be sure to make and go to all appointments, and call your doctor if you are having problems. It's also a good idea to know your test results and keep a list of the medicines you take.  When should you call for help?  Call 911  anytime you think you may need emergency care. For example, call if:  You have severe vaginal bleeding. You have soaked through one or more pads in an hour, and the bleeding is not slowing down.  You have chest

## 2025-07-02 ENCOUNTER — ROUTINE PRENATAL (OUTPATIENT)
Dept: OBGYN CLINIC | Age: 25
End: 2025-07-02

## 2025-07-02 VITALS
HEART RATE: 96 BPM | DIASTOLIC BLOOD PRESSURE: 64 MMHG | WEIGHT: 158 LBS | BODY MASS INDEX: 29.85 KG/M2 | SYSTOLIC BLOOD PRESSURE: 100 MMHG

## 2025-07-02 DIAGNOSIS — Z34.82 ENCOUNTER FOR SUPERVISION OF OTHER NORMAL PREGNANCY IN SECOND TRIMESTER: Primary | ICD-10-CM

## 2025-07-02 DIAGNOSIS — Z3A.16 16 WEEKS GESTATION OF PREGNANCY: ICD-10-CM

## 2025-07-02 PROCEDURE — 0502F SUBSEQUENT PRENATAL CARE: CPT | Performed by: NURSE PRACTITIONER

## 2025-07-02 NOTE — PROGRESS NOTES
[facility] Prenatal Office Note  Subjective:  Matthew Rivas is here for a return obstetrical visit. Today she is 16w6d weeks EGA. She is doing well, taking her PNV as directed, and has no complaints.  She  does not have vaginal bleeding, n/v, syncope, or headaches.  Pt does feel fetal movement regularly.    Objective:  Mother's Prenatal Vitals  BP: 100/64  Weight - Scale: 71.7 kg (158 lb)  Pulse: 96  Patient Position: Sitting  Prenatal Fetal Information  Movement: Increased  Pt is A&Ox3, in no acute distress. Normocephalic, atraumatic. PERRL. Resp even and non-labored. Skin pink, warm & dry. Gravid abdomen. WONG's well. Gait steady.   Assessment:    IUP at 16w6d wks      Diagnosis Orders   1. 16 weeks gestation of pregnancy        2. Encounter for supervision of other normal pregnancy in second trimester          Plan:  Pt counseled on balanced nutrition, adequate fluid intake, taking PNV daily, and exercise along with plans to set up anatomy scan for 20 weeks  Continue with routine prenatal care.  No follow-ups on file.       MEDICATIONS:  No orders of the defined types were placed in this encounter.    ORDERS:  No orders of the defined types were placed in this encounter.      More than 50% of this 20 min visit was education and counseling.    I have seen and examined the patient independently.  I reviewed all laboratory and imaging studies that are relevant.  I have reviewed and made any appropriate changes to the HPI.    Electronically signed by SHANTI Albarado CNM on 7/8/25  at 10:09 PM [facility]ET

## 2025-07-02 NOTE — PROGRESS NOTES
Pt denies any vaginal leaking bleeding or contractions. + Fetal movement.     Pt states she had some swelling in her feet

## 2025-07-21 ENCOUNTER — TELEPHONE (OUTPATIENT)
Dept: OBGYN CLINIC | Age: 25
End: 2025-07-21

## 2025-07-21 NOTE — TELEPHONE ENCOUNTER
Matthew called to reschedule an appointment for prenatal visit that is on 7/23/25. Matthew would like to come in after her ultrasound on Friday. The ultrasound is scheduled at [facility] at 9 on 7/25/25. [facility] was unable to accommodate in the time frame needed. Please be advised that the best time to call [facility].    Thank you.

## 2025-07-25 ENCOUNTER — ROUTINE PRENATAL (OUTPATIENT)
Dept: OBGYN CLINIC | Age: 25
End: 2025-07-25

## 2025-07-25 VITALS
HEART RATE: 96 BPM | BODY MASS INDEX: 30.23 KG/M2 | SYSTOLIC BLOOD PRESSURE: 95 MMHG | WEIGHT: 160 LBS | DIASTOLIC BLOOD PRESSURE: 62 MMHG

## 2025-07-25 DIAGNOSIS — Z34.82 ENCOUNTER FOR SUPERVISION OF OTHER NORMAL PREGNANCY IN SECOND TRIMESTER: Primary | ICD-10-CM

## 2025-07-25 DIAGNOSIS — Z3A.20 20 WEEKS GESTATION OF PREGNANCY: ICD-10-CM

## 2025-07-25 PROCEDURE — 0502F SUBSEQUENT PRENATAL CARE: CPT | Performed by: NURSE PRACTITIONER

## 2025-07-25 NOTE — PROGRESS NOTES
21  Bethany Walden : 1970 Sex: female  Age 45 y.o. Subjective:  Chief Complaint   Patient presents with    Headache     sx started monday    Nasal Congestion    Generalized Body Aches       HPI:   Bethany Walden , 45 y.o. female presents to the clinic for evaluation of sinus congestion x 3 days. The patient also reports headache, cough, and body aches. The patient has taken mucinex for symptoms. The patient reports unchanged symptoms over time. The patient reports COVID-19 ill exposure. The patient denies hx of COVID-19 and reports having the vaccines. The patient denies acute loss of taste and smell, sore throat, rash, and fever. The patient also denies chest pain, abdominal pain, shortness of breath, and nausea / vomiting / diarrhea. ROS:   Unless otherwise stated in this report the patient's positive and negative responses for review of systems for constitutional, eyes, ENT, cardiovascular, respiratory, gastrointestinal, neurological, , musculoskeletal, and integument systems and related systems to the presenting problem are either stated in the history of present illness or were not pertinent or were negative for the symptoms and/or complaints related to the presenting medical problem. Positives and pertinent negatives as per HPI. All others reviewed and are negative. PMH:     Past Medical History:   Diagnosis Date    Environmental allergies     Seasonal allergies        Past Surgical History:   Procedure Laterality Date    HERNIA REPAIR      HYSTERECTOMY         History reviewed. No pertinent family history.     Medications:     Current Outpatient Medications:     atorvastatin (LIPITOR) 10 MG tablet, take 1 tablet by mouth at bedtime, Disp: , Rfl:     fluticasone (FLONASE) 50 MCG/ACT nasal spray, , Disp: , Rfl:     azithromycin (ZITHROMAX Z-WAYNE) 250 MG tablet, Take 1 tablet by mouth daily Take 2 tabs on day one, then 1 tab daily for the next 4 days, Disp: 6 tablet, [facility] Prenatal Office Note  Subjective:  Matthew Rivas is here for a return obstetrical visit. Today she is 20w1d weeks EGA.   She is taking her prenatal vitamins and is aware of nutrition needs. She reports the following:    Problems/complaints today:  No complaints today   Objective:  Mother's Prenatal Vitals  BP: 95/62  Weight - Scale: 72.6 kg (160 lb)  Pulse: 96  Patient Position: Sitting  Prenatal Fetal Information  Fetal HR:   Movement: Present  Pt is A&Ox3, in no acute distress. Normocephalic, atraumatic. PERRL. Resp even and non-labored. Skin pink, warm & dry. Gravid abdomen. WONG's well. Gait steady.   Assessment:    IUP at 20w1d wks      Diagnosis Orders   1. Encounter for supervision of other normal pregnancy in second trimester        2. 20 weeks gestation of pregnancy          Plan:  Problems/complaints Management Plan:  Continue current PNV dose.  Anatomy scan reviewed.   Routine OB Management Plan:  Pt counseled on balanced nutrition, adequate fluid intake, taking PNV daily, and exercise along with GHTN precautions and Kick count  Continue with routine prenatal care.  RTC in 4 wks for prenatal visit.      MEDICATIONS:  No orders of the defined types were placed in this encounter.    ORDERS:  No orders of the defined types were placed in this encounter.      I Cuca Elkins MA am scribing for and in the presence of SINGH High.  7/25/25  11:38 AM CDT .    I have seen and examined the patient independently.  I reviewed all laboratory and imaging studies that are relevant.  I have reviewed and made any appropriate changes to the HPI.    Electronically signed by SHANTI Albarado CNM on 7/28/25  at 8:21 AM CDT    Rfl: 0    mometasone (NASONEX) 50 MCG/ACT nasal spray, 1 spray by Each Nostril route every 12 hours for 7 days, Disp: 1 Inhaler, Rfl: 0    Allergies:   No Known Allergies    Social History:     Social History     Tobacco Use    Smoking status: Never Smoker    Smokeless tobacco: Never Used   Vaping Use    Vaping Use: Never used   Substance Use Topics    Alcohol use: Not Currently    Drug use: Never       Patient lives at home. Physical Exam:     Vitals:    12/23/21 1402   BP: 110/70   Pulse: 113   Resp: 16   Temp: 98.7 °F (37.1 °C)   SpO2: 98%   Weight: 156 lb (70.8 kg)   Height: 5' 8\" (1.727 m)       Physical Exam (PE)    Physical Exam  Constitutional:       Appearance: Normal appearance. HENT:      Head: Normocephalic. Right Ear: Tympanic membrane, ear canal and external ear normal.      Left Ear: Tympanic membrane, ear canal and external ear normal.      Nose: Congestion and rhinorrhea present. Mouth/Throat:      Mouth: Mucous membranes are moist.      Pharynx: Oropharynx is clear. Eyes:      Pupils: Pupils are equal, round, and reactive to light. Cardiovascular:      Rate and Rhythm: Normal rate and regular rhythm. Pulses: Normal pulses. Heart sounds: Normal heart sounds. Pulmonary:      Effort: Pulmonary effort is normal.      Breath sounds: Normal breath sounds. No wheezing, rhonchi or rales. Abdominal:      General: Bowel sounds are normal.      Palpations: Abdomen is soft. Musculoskeletal:         General: Normal range of motion. Cervical back: Normal range of motion and neck supple. Lymphadenopathy:      Cervical: No cervical adenopathy. Skin:     General: Skin is warm and dry. Capillary Refill: Capillary refill takes less than 2 seconds. Neurological:      General: No focal deficit present. Mental Status: She is alert and oriented to person, place, and time.    Psychiatric:         Mood and Affect: Mood normal.         Behavior: Behavior normal.

## 2025-07-25 NOTE — PATIENT INSTRUCTIONS
Patient Education        Weeks 18 to 22 of Your Pregnancy: Care Instructions  At this stage you may find that your nausea and fatigue are gone. You may feel better overall and have more energy. But you might now also have some new discomforts, like sleep problems or leg cramps.    You may start to feel your baby move. These movements can feel like butterflies or bubbles.   Babies at this stage can now suck their thumbs.     Get some exercise every day.  And avoid caffeine late in the day.     Take a warm shower or bath before bed.  Try relaxation exercises to calm your mind and body.     Use extra pillows.  They can help you get comfortable.     Don't use sleeping pills or alcohol.  They could harm your baby.     For leg cramps, stretch and apply heat.  A warm bath, leg warmers, a heating pad, or a hot water bottle can help with muscle aches.   Stretches for leg cramps    Straighten your leg and bend your foot (flex your ankle) slowly upward, toward your knee. Bend your toes up and down.   Stand on a flat surface. Stretch your toes upward. For balance, hold on to the wall or something stable. If it feels okay, take small steps walking on your heels.   Follow-up care is a key part of your treatment and safety. Be sure to make and go to all appointments, and call your doctor if you are having problems. It's also a good idea to know your test results and keep a list of the medicines you take.  Where can you learn more?  Go to https://www.[employer].net/patientEd and enter W603 to learn more about \"Weeks 18 to 22 of Your Pregnancy: Care Instructions.\"  Current as of: April 30, 2024  Content Version: 14.5  © 7271-5335 [employer].   Care instructions adapted under license by [employer]. If you have questions about a medical condition or this instruction, always ask your healthcare professional. [employer], disclaims any warranty or liability for your use of this information.

## 2025-07-28 DIAGNOSIS — Z3A.20 20 WEEKS GESTATION OF PREGNANCY: ICD-10-CM

## 2025-07-28 DIAGNOSIS — Z3A.20 20 WEEKS GESTATION OF PREGNANCY: Primary | ICD-10-CM

## 2025-08-22 ENCOUNTER — ROUTINE PRENATAL (OUTPATIENT)
Dept: OBGYN CLINIC | Age: 25
End: 2025-08-22

## 2025-08-22 VITALS
SYSTOLIC BLOOD PRESSURE: 117 MMHG | HEART RATE: 112 BPM | WEIGHT: 161 LBS | DIASTOLIC BLOOD PRESSURE: 75 MMHG | BODY MASS INDEX: 30.42 KG/M2

## 2025-08-22 DIAGNOSIS — O26.892 VAGINAL DISCHARGE DURING PREGNANCY IN SECOND TRIMESTER: ICD-10-CM

## 2025-08-22 DIAGNOSIS — Z3A.24 24 WEEKS GESTATION OF PREGNANCY: Primary | ICD-10-CM

## 2025-08-22 DIAGNOSIS — Z34.82 ENCOUNTER FOR SUPERVISION OF OTHER NORMAL PREGNANCY IN SECOND TRIMESTER: ICD-10-CM

## 2025-08-22 DIAGNOSIS — N89.8 VAGINAL DISCHARGE DURING PREGNANCY IN SECOND TRIMESTER: ICD-10-CM

## 2025-08-26 DIAGNOSIS — O26.892 VAGINAL DISCHARGE DURING PREGNANCY IN SECOND TRIMESTER: Primary | ICD-10-CM

## 2025-08-26 DIAGNOSIS — N89.8 VAGINAL DISCHARGE DURING PREGNANCY IN SECOND TRIMESTER: Primary | ICD-10-CM

## 2025-08-26 DIAGNOSIS — N76.0 VAGINAL INFECTION: ICD-10-CM

## 2025-08-26 RX ORDER — METRONIDAZOLE 500 MG/1
500 TABLET ORAL 2 TIMES DAILY
Qty: 14 TABLET | Refills: 0 | Status: SHIPPED | OUTPATIENT
Start: 2025-08-26 | End: 2025-09-02

## (undated) DEVICE — MASK ROUND 60MM FPH (10) S/USE: Brand: FISHER & PAYKEL HEALTHCARE

## (undated) DEVICE — BARRIER ADH W3XL4IN UTER PELV ABSRB GYNECARE INTCEED

## (undated) DEVICE — AGENT HEMSTAT 3GM OXIDIZED REGENERATED CELOS ABSRB FOR CONT (ORDER MULTIPLES OF 5EA)

## (undated) DEVICE — 1LYRTR 16FR10ML 100%SILI SNAP: Brand: MEDLINE INDUSTRIES, INC.

## (undated) DEVICE — GLOVE ORANGE PI 7 1/2   MSG9075

## (undated) DEVICE — STERILE POLYISOPRENE POWDER-FREE SURGICAL GLOVES: Brand: PROTEXIS

## (undated) DEVICE — AIR SHEET,LAT,COMFORT GLIDE, BLEND 40X80: Brand: MEDLINE

## (undated) DEVICE — DRESSING NEG PRSS 13CM PREVENA

## (undated) DEVICE — SPONGE LAP W18XL18IN WHT COT 4 PLY FLD STRUNG RADPQ DISP ST 2 PER PACK

## (undated) DEVICE — GARMENT COMPR STD FOR 17IN CALF UNIF THER FLOTRN

## (undated) DEVICE — TRAY EPI 25GA 3.5IN 0.75% BIPIVCAIN 8.25% D CONTAIN BPA

## (undated) DEVICE — SUTURE VICRYL + SZ 0 L36IN ABSRB UD L48MM CTX 1/2 CIR VCP978H

## (undated) DEVICE — Device

## (undated) DEVICE — BINDER ABD H12IN COT FOR 45-62IN WAIST UNIV PREM 4 PNL DSGN

## (undated) DEVICE — SUTURE STRATAFIX SYMMETRIC PDS + SZ 1 L18IN ABSRB VLT OS-6 SXPP1A201

## (undated) DEVICE — SUTURE VICRYL SZ 0 L36IN ABSRB VLT L36MM CT-1 1/2 CIR J346H

## (undated) DEVICE — SUTURE VICRYL + SZ 2-0 L36IN ABSRB UD L36MM CT-1 1/2 CIR VCP945H

## (undated) DEVICE — SUTURE MONOCRYL SZ 3-0 L27IN ABSRB UD L60MM KS STR REV CUT Y523H